# Patient Record
Sex: FEMALE | Race: WHITE | NOT HISPANIC OR LATINO | Employment: PART TIME | URBAN - METROPOLITAN AREA
[De-identification: names, ages, dates, MRNs, and addresses within clinical notes are randomized per-mention and may not be internally consistent; named-entity substitution may affect disease eponyms.]

---

## 2017-04-08 ENCOUNTER — COMMUNICATION - HEALTHEAST (OUTPATIENT)
Dept: FAMILY MEDICINE | Facility: CLINIC | Age: 37
End: 2017-04-08

## 2017-04-08 DIAGNOSIS — F33.9 MAJOR DEPRESSIVE DISORDER, RECURRENT EPISODE, UNSPECIFIED: ICD-10-CM

## 2017-04-19 ENCOUNTER — COMMUNICATION - HEALTHEAST (OUTPATIENT)
Dept: FAMILY MEDICINE | Facility: CLINIC | Age: 37
End: 2017-04-19

## 2017-06-21 ENCOUNTER — COMMUNICATION - HEALTHEAST (OUTPATIENT)
Dept: FAMILY MEDICINE | Facility: CLINIC | Age: 37
End: 2017-06-21

## 2017-06-21 DIAGNOSIS — F33.9 MAJOR DEPRESSIVE DISORDER, RECURRENT EPISODE, UNSPECIFIED: ICD-10-CM

## 2017-07-05 ENCOUNTER — COMMUNICATION - HEALTHEAST (OUTPATIENT)
Dept: FAMILY MEDICINE | Facility: CLINIC | Age: 37
End: 2017-07-05

## 2017-08-07 ENCOUNTER — COMMUNICATION - HEALTHEAST (OUTPATIENT)
Dept: FAMILY MEDICINE | Facility: CLINIC | Age: 37
End: 2017-08-07

## 2017-08-07 DIAGNOSIS — F33.9 MAJOR DEPRESSIVE DISORDER, RECURRENT EPISODE, UNSPECIFIED: ICD-10-CM

## 2017-08-12 ENCOUNTER — RECORDS - HEALTHEAST (OUTPATIENT)
Dept: ADMINISTRATIVE | Facility: OTHER | Age: 37
End: 2017-08-12

## 2017-08-14 ENCOUNTER — COMMUNICATION - HEALTHEAST (OUTPATIENT)
Dept: FAMILY MEDICINE | Facility: CLINIC | Age: 37
End: 2017-08-14

## 2017-09-02 ENCOUNTER — COMMUNICATION - HEALTHEAST (OUTPATIENT)
Dept: FAMILY MEDICINE | Facility: CLINIC | Age: 37
End: 2017-09-02

## 2017-09-02 DIAGNOSIS — F33.9 MAJOR DEPRESSIVE DISORDER, RECURRENT EPISODE, UNSPECIFIED: ICD-10-CM

## 2017-09-05 ENCOUNTER — COMMUNICATION - HEALTHEAST (OUTPATIENT)
Dept: FAMILY MEDICINE | Facility: CLINIC | Age: 37
End: 2017-09-05

## 2017-09-05 DIAGNOSIS — F33.9 MAJOR DEPRESSIVE DISORDER, RECURRENT EPISODE, UNSPECIFIED: ICD-10-CM

## 2017-09-11 ENCOUNTER — COMMUNICATION - HEALTHEAST (OUTPATIENT)
Dept: FAMILY MEDICINE | Facility: CLINIC | Age: 37
End: 2017-09-11

## 2017-10-07 ENCOUNTER — COMMUNICATION - HEALTHEAST (OUTPATIENT)
Dept: FAMILY MEDICINE | Facility: CLINIC | Age: 37
End: 2017-10-07

## 2017-10-07 DIAGNOSIS — F33.9 MAJOR DEPRESSIVE DISORDER, RECURRENT EPISODE (H): ICD-10-CM

## 2017-10-15 ENCOUNTER — COMMUNICATION - HEALTHEAST (OUTPATIENT)
Dept: FAMILY MEDICINE | Facility: CLINIC | Age: 37
End: 2017-10-15

## 2017-10-15 DIAGNOSIS — F33.9 MAJOR DEPRESSIVE DISORDER, RECURRENT EPISODE (H): ICD-10-CM

## 2017-10-25 ENCOUNTER — COMMUNICATION - HEALTHEAST (OUTPATIENT)
Dept: FAMILY MEDICINE | Facility: CLINIC | Age: 37
End: 2017-10-25

## 2017-11-19 ENCOUNTER — COMMUNICATION - HEALTHEAST (OUTPATIENT)
Dept: FAMILY MEDICINE | Facility: CLINIC | Age: 37
End: 2017-11-19

## 2017-11-19 DIAGNOSIS — F33.9 MAJOR DEPRESSIVE DISORDER, RECURRENT EPISODE (H): ICD-10-CM

## 2017-11-20 ENCOUNTER — COMMUNICATION - HEALTHEAST (OUTPATIENT)
Dept: FAMILY MEDICINE | Facility: CLINIC | Age: 37
End: 2017-11-20

## 2017-11-24 ENCOUNTER — OFFICE VISIT - HEALTHEAST (OUTPATIENT)
Dept: FAMILY MEDICINE | Facility: CLINIC | Age: 37
End: 2017-11-24

## 2017-11-24 DIAGNOSIS — F33.9 MAJOR DEPRESSIVE DISORDER, RECURRENT EPISODE (H): ICD-10-CM

## 2017-11-24 DIAGNOSIS — G47.00 INSOMNIA, UNSPECIFIED TYPE: ICD-10-CM

## 2017-11-24 DIAGNOSIS — R41.840 DIFFICULTY CONCENTRATING: ICD-10-CM

## 2017-11-24 DIAGNOSIS — N39.0 LOWER URINARY TRACT INFECTIOUS DISEASE: ICD-10-CM

## 2017-11-24 RX ORDER — NITROFURANTOIN 25; 75 MG/1; MG/1
CAPSULE ORAL
Qty: 30 CAPSULE | Refills: 1 | Status: SHIPPED | OUTPATIENT
Start: 2017-11-24 | End: 2022-04-18

## 2017-11-24 RX ORDER — HYDROXYZINE HYDROCHLORIDE 25 MG/1
25 TABLET, FILM COATED ORAL DAILY PRN
Qty: 30 TABLET | Refills: 2 | Status: SHIPPED | OUTPATIENT
Start: 2017-11-24 | End: 2023-09-28

## 2017-11-24 ASSESSMENT — MIFFLIN-ST. JEOR: SCORE: 1573.99

## 2019-03-17 ENCOUNTER — COMMUNICATION - HEALTHEAST (OUTPATIENT)
Dept: FAMILY MEDICINE | Facility: CLINIC | Age: 39
End: 2019-03-17

## 2019-03-17 DIAGNOSIS — F33.9 MAJOR DEPRESSIVE DISORDER, RECURRENT EPISODE (H): ICD-10-CM

## 2019-03-21 ENCOUNTER — AMBULATORY - HEALTHEAST (OUTPATIENT)
Dept: FAMILY MEDICINE | Facility: CLINIC | Age: 39
End: 2019-03-21

## 2019-03-21 DIAGNOSIS — F33.9 MAJOR DEPRESSIVE DISORDER, RECURRENT EPISODE (H): ICD-10-CM

## 2019-05-01 ENCOUNTER — COMMUNICATION - HEALTHEAST (OUTPATIENT)
Dept: FAMILY MEDICINE | Facility: CLINIC | Age: 39
End: 2019-05-01

## 2019-05-14 ENCOUNTER — COMMUNICATION - HEALTHEAST (OUTPATIENT)
Dept: FAMILY MEDICINE | Facility: CLINIC | Age: 39
End: 2019-05-14

## 2019-05-16 ENCOUNTER — AMBULATORY - HEALTHEAST (OUTPATIENT)
Dept: FAMILY MEDICINE | Facility: CLINIC | Age: 39
End: 2019-05-16

## 2019-05-16 ENCOUNTER — COMMUNICATION - HEALTHEAST (OUTPATIENT)
Dept: SCHEDULING | Facility: CLINIC | Age: 39
End: 2019-05-16

## 2019-05-16 ENCOUNTER — COMMUNICATION - HEALTHEAST (OUTPATIENT)
Dept: FAMILY MEDICINE | Facility: CLINIC | Age: 39
End: 2019-05-16

## 2019-05-16 DIAGNOSIS — F33.9 MAJOR DEPRESSIVE DISORDER, RECURRENT EPISODE (H): ICD-10-CM

## 2019-05-17 ENCOUNTER — COMMUNICATION - HEALTHEAST (OUTPATIENT)
Dept: FAMILY MEDICINE | Facility: CLINIC | Age: 39
End: 2019-05-17

## 2019-06-13 ENCOUNTER — AMBULATORY - HEALTHEAST (OUTPATIENT)
Dept: FAMILY MEDICINE | Facility: CLINIC | Age: 39
End: 2019-06-13

## 2019-06-13 ENCOUNTER — COMMUNICATION - HEALTHEAST (OUTPATIENT)
Dept: FAMILY MEDICINE | Facility: CLINIC | Age: 39
End: 2019-06-13

## 2019-06-13 DIAGNOSIS — F33.9 MAJOR DEPRESSIVE DISORDER, RECURRENT EPISODE (H): ICD-10-CM

## 2019-06-14 ENCOUNTER — COMMUNICATION - HEALTHEAST (OUTPATIENT)
Dept: TELEHEALTH | Facility: CLINIC | Age: 39
End: 2019-06-14

## 2019-06-14 ENCOUNTER — OFFICE VISIT - HEALTHEAST (OUTPATIENT)
Dept: FAMILY MEDICINE | Facility: CLINIC | Age: 39
End: 2019-06-14

## 2019-06-14 DIAGNOSIS — F33.9 MAJOR DEPRESSIVE DISORDER, RECURRENT EPISODE (H): ICD-10-CM

## 2019-06-17 ENCOUNTER — COMMUNICATION - HEALTHEAST (OUTPATIENT)
Dept: FAMILY MEDICINE | Facility: CLINIC | Age: 39
End: 2019-06-17

## 2019-08-04 ENCOUNTER — RECORDS - HEALTHEAST (OUTPATIENT)
Dept: SCHEDULING | Facility: CLINIC | Age: 39
End: 2019-08-04

## 2019-08-04 ENCOUNTER — COMMUNICATION - HEALTHEAST (OUTPATIENT)
Dept: FAMILY MEDICINE | Facility: CLINIC | Age: 39
End: 2019-08-04

## 2019-08-04 DIAGNOSIS — F33.9 MAJOR DEPRESSIVE DISORDER, RECURRENT EPISODE (H): ICD-10-CM

## 2019-09-20 ENCOUNTER — COMMUNICATION - HEALTHEAST (OUTPATIENT)
Dept: FAMILY MEDICINE | Facility: CLINIC | Age: 39
End: 2019-09-20

## 2020-01-14 ENCOUNTER — COMMUNICATION - HEALTHEAST (OUTPATIENT)
Dept: FAMILY MEDICINE | Facility: CLINIC | Age: 40
End: 2020-01-14

## 2020-01-14 DIAGNOSIS — F33.9 MAJOR DEPRESSIVE DISORDER, RECURRENT EPISODE (H): ICD-10-CM

## 2020-07-19 ENCOUNTER — COMMUNICATION - HEALTHEAST (OUTPATIENT)
Dept: FAMILY MEDICINE | Facility: CLINIC | Age: 40
End: 2020-07-19

## 2020-07-19 DIAGNOSIS — F33.9 MAJOR DEPRESSIVE DISORDER, RECURRENT EPISODE (H): ICD-10-CM

## 2020-07-20 RX ORDER — BUPROPION HYDROCHLORIDE 150 MG/1
TABLET ORAL
Qty: 30 TABLET | Refills: 0 | Status: SHIPPED | OUTPATIENT
Start: 2020-07-20 | End: 2022-04-18

## 2020-07-23 ENCOUNTER — COMMUNICATION - HEALTHEAST (OUTPATIENT)
Dept: FAMILY MEDICINE | Facility: CLINIC | Age: 40
End: 2020-07-23

## 2020-07-23 DIAGNOSIS — F33.9 MAJOR DEPRESSIVE DISORDER, RECURRENT EPISODE (H): ICD-10-CM

## 2020-08-07 ENCOUNTER — COMMUNICATION - HEALTHEAST (OUTPATIENT)
Dept: FAMILY MEDICINE | Facility: CLINIC | Age: 40
End: 2020-08-07

## 2020-08-07 DIAGNOSIS — F33.9 MAJOR DEPRESSIVE DISORDER, RECURRENT EPISODE (H): ICD-10-CM

## 2020-10-08 ENCOUNTER — OFFICE VISIT - HEALTHEAST (OUTPATIENT)
Dept: FAMILY MEDICINE | Facility: CLINIC | Age: 40
End: 2020-10-08

## 2020-10-08 DIAGNOSIS — F41.9 ANXIETY: ICD-10-CM

## 2020-10-08 DIAGNOSIS — F33.0 MILD EPISODE OF RECURRENT MAJOR DEPRESSIVE DISORDER (H): ICD-10-CM

## 2020-10-08 DIAGNOSIS — I10 BENIGN ESSENTIAL HYPERTENSION: ICD-10-CM

## 2020-10-08 DIAGNOSIS — E66.01 MORBID OBESITY (H): ICD-10-CM

## 2020-10-08 ASSESSMENT — PATIENT HEALTH QUESTIONNAIRE - PHQ9: SUM OF ALL RESPONSES TO PHQ QUESTIONS 1-9: 3

## 2020-11-03 ENCOUNTER — COMMUNICATION - HEALTHEAST (OUTPATIENT)
Dept: FAMILY MEDICINE | Facility: CLINIC | Age: 40
End: 2020-11-03

## 2020-11-03 DIAGNOSIS — I10 BENIGN ESSENTIAL HYPERTENSION: ICD-10-CM

## 2020-11-09 ENCOUNTER — COMMUNICATION - HEALTHEAST (OUTPATIENT)
Dept: FAMILY MEDICINE | Facility: CLINIC | Age: 40
End: 2020-11-09

## 2020-11-09 DIAGNOSIS — F33.9 MAJOR DEPRESSIVE DISORDER, RECURRENT EPISODE (H): ICD-10-CM

## 2020-11-10 RX ORDER — PAROXETINE 30 MG/1
TABLET, FILM COATED ORAL
Qty: 180 TABLET | Refills: 2 | Status: SHIPPED | OUTPATIENT
Start: 2020-11-10 | End: 2021-09-07

## 2020-11-24 ENCOUNTER — AMBULATORY - HEALTHEAST (OUTPATIENT)
Dept: FAMILY MEDICINE | Facility: CLINIC | Age: 40
End: 2020-11-24

## 2020-11-24 DIAGNOSIS — Z79.899 MEDICATION MANAGEMENT: ICD-10-CM

## 2020-11-24 DIAGNOSIS — Z13.220 LIPID SCREENING: ICD-10-CM

## 2020-12-05 ENCOUNTER — COMMUNICATION - HEALTHEAST (OUTPATIENT)
Dept: FAMILY MEDICINE | Facility: CLINIC | Age: 40
End: 2020-12-05

## 2020-12-05 DIAGNOSIS — I10 BENIGN ESSENTIAL HYPERTENSION: ICD-10-CM

## 2021-01-18 ENCOUNTER — AMBULATORY - HEALTHEAST (OUTPATIENT)
Dept: LAB | Facility: CLINIC | Age: 41
End: 2021-01-18

## 2021-01-18 DIAGNOSIS — Z79.899 MEDICATION MANAGEMENT: ICD-10-CM

## 2021-01-18 DIAGNOSIS — Z13.220 LIPID SCREENING: ICD-10-CM

## 2021-01-18 LAB
ANION GAP SERPL CALCULATED.3IONS-SCNC: 12 MMOL/L (ref 5–18)
BUN SERPL-MCNC: 10 MG/DL (ref 8–22)
CALCIUM SERPL-MCNC: 9.2 MG/DL (ref 8.5–10.5)
CHLORIDE BLD-SCNC: 105 MMOL/L (ref 98–107)
CHOLEST SERPL-MCNC: 299 MG/DL
CO2 SERPL-SCNC: 22 MMOL/L (ref 22–31)
CREAT SERPL-MCNC: 0.81 MG/DL (ref 0.6–1.1)
FASTING STATUS PATIENT QL REPORTED: YES
GFR SERPL CREATININE-BSD FRML MDRD: >60 ML/MIN/1.73M2
GLUCOSE BLD-MCNC: 109 MG/DL (ref 70–125)
HDLC SERPL-MCNC: 37 MG/DL
LDLC SERPL CALC-MCNC: 218 MG/DL
LDLC SERPL CALC-MCNC: ABNORMAL MG/DL
POTASSIUM BLD-SCNC: 4.5 MMOL/L (ref 3.5–5)
SODIUM SERPL-SCNC: 139 MMOL/L (ref 136–145)
TRIGL SERPL-MCNC: 490 MG/DL

## 2021-01-19 ENCOUNTER — COMMUNICATION - HEALTHEAST (OUTPATIENT)
Dept: FAMILY MEDICINE | Facility: CLINIC | Age: 41
End: 2021-01-19

## 2021-01-19 DIAGNOSIS — I10 BENIGN ESSENTIAL HYPERTENSION: ICD-10-CM

## 2021-01-19 RX ORDER — HYDROCHLOROTHIAZIDE 12.5 MG/1
12.5 TABLET ORAL DAILY
Qty: 90 TABLET | Refills: 3 | Status: SHIPPED | OUTPATIENT
Start: 2021-01-19 | End: 2022-04-18

## 2021-01-24 ENCOUNTER — AMBULATORY - HEALTHEAST (OUTPATIENT)
Dept: FAMILY MEDICINE | Facility: CLINIC | Age: 41
End: 2021-01-24

## 2021-01-24 DIAGNOSIS — E78.2 MIXED HYPERLIPIDEMIA: ICD-10-CM

## 2021-01-24 DIAGNOSIS — R73.01 IMPAIRED FASTING GLUCOSE: ICD-10-CM

## 2021-05-27 ASSESSMENT — PATIENT HEALTH QUESTIONNAIRE - PHQ9: SUM OF ALL RESPONSES TO PHQ QUESTIONS 1-9: 3

## 2021-05-28 NOTE — TELEPHONE ENCOUNTER
Refill Request  Did you contact pharmacy: No  Medication name: PARoxetine (PAXIL) 30 MG tablet   Medication   Date: 11/24/2017 Department: [facility] Medicine/OB Ordering/Authorizing: Teresa Ortiz CNP  Order Providers     Prescribing Provider Encounter Provider  Teresa Ortiz CNP Pierce, Jana L, CNP  Outpatient Medication Detail      Disp Refills Start End   PARoxetine (PAXIL) 30 MG tablet 180 tablet 3 11/24/2017    Sig: TAKE 2 TABLETS BY MOUTH EVERY MORNING.   Sent to pharmacy as: PARoxetine 30 mg tablet   E-Prescribing Status: Receipt confirmed by pharmacy (11/24/2017 10:58 AM CST)   Associated Diagnoses     Major depressive disorder, recurrent episode (H)  - Primary     Pharmacy     [place] [address] [place] [address]  Requested Prescriptions      No prescriptions requested or ordered in this encounter     Who prescribed the medication: Teresa Ortiz  Pharmacy Name and Location: [facility]  Is patient out of medication: No.  2 days left  Patient notified refills processed in 72 hours:  yes  Okay to leave a detailed message: yes

## 2021-05-28 NOTE — TELEPHONE ENCOUNTER
I sent a prescription to the pharmacy for the Paroxetine.  She should keep her appointment, please. Thanks.

## 2021-05-28 NOTE — TELEPHONE ENCOUNTER
Left message to call back for: pt  Information to relay to patient:  Left message to call and schedule medication check with Teresa for refill.

## 2021-05-28 NOTE — TELEPHONE ENCOUNTER
"Who is calling:  The patient   Reason for Call:  The patient is calling to check the status of the medication request. The patient is aware that the script is pending review. The patient complained of having \"withdrawal symptoms\" the patient was transferred to the triage nurse.    Date of last appointment with primary care: 11/24/2017  Okay to leave a detailed message: No  "

## 2021-05-28 NOTE — TELEPHONE ENCOUNTER
Left message to call back for: Patient.  Information to relay to patient: Please help patient schedule a medication check. Thank you.

## 2021-05-28 NOTE — TELEPHONE ENCOUNTER
Who is calling:  Patient   Reason for Call:  States she is running late for her appointment.  Please call her back as she is on her way now.  Date of last appointment with primary care:   Okay to leave a detailed message: Yes

## 2021-05-28 NOTE — TELEPHONE ENCOUNTER
I have not seen her since 2017.  She needs an office visit.  Okay to use same day/reserved.  Thanks.

## 2021-05-28 NOTE — TELEPHONE ENCOUNTER
Medication Request  Medication name: PARoxetine (PAXIL) 30 MG tablet  Pharmacy Name and Location: [facility]  Reason for request: Out, the patient does have an appointment with Teresa Ortiz on Friday.  When did you use medication last?:  Today.  Patient offered appointment:  Already has one.  Okay to leave a detailed message: yes

## 2021-05-28 NOTE — TELEPHONE ENCOUNTER
Medication Request  Medication name:  Paroxetine  Pharmacy Name and Location:  [facility] in [place]  Reason for request:  patient does not feel right, she feels that she has been off the medication too long, she is shaky, could she get a few called in to pharmacy till she is seen on 5/17/2019  When did you use medication last?:   2017  Patient offered appointment:  Patient has an appointment on 5/17/2019  Okay to leave a detailed message: yes

## 2021-05-28 NOTE — TELEPHONE ENCOUNTER
RN cannot approve Refill Request    RN can NOT refill this medication PCP messaged that patient is overdue for Office Visit. Last office visit: 11/24/2017 Teresa Ortiz CNP Last Physical: Visit date not found Last MTM visit: Visit date not found Last visit same specialty: 11/24/2017 Teresa Ortiz CNP.  Next visit within 3 mo: Visit date not found  Next physical within 3 mo: Visit date not found      Dahiana Mcghee, [facility] Triage/Med Refill 5/16/2019    Requested Prescriptions   Pending Prescriptions Disp Refills     PARoxetine (PAXIL) 30 MG tablet [Pharmacy Med Name: PAROXETINE HCL 30 MG TABLET] 180 tablet 2     Sig: TAKE 2 TABLETS BY MOUTH EVERY MORNING.       SSRI Refill Protocol  Failed - 5/16/2019  5:18 PM        Failed - PCP or prescribing provider visit in last year     Last office visit with prescriber/PCP: 11/24/2017 Teresa Ortiz CNP OR same dept: Visit date not found OR same specialty: 11/24/2017 Teresa Ortiz CNP  Last physical: Visit date not found Last MTM visit: Visit date not found   Next visit within 3 mo: Visit date not found  Next physical within 3 mo: Visit date not found  Prescriber OR PCP: Teresa Ortiz CNP  Last diagnosis associated with med order: 1. Major depressive disorder, recurrent episode (H)  - PARoxetine (PAXIL) 30 MG tablet [Pharmacy Med Name: PAROXETINE HCL 30 MG TABLET]; TAKE 2 TABLETS BY MOUTH EVERY MORNING.  Dispense: 180 tablet; Refill: 2    If protocol passes may refill for 12 months if within 3 months of last provider visit (or a total of 15 months).             Failed - Age 21 and younger route to prescribing provider     Last office visit with prescriber/PCP: 11/24/2017 Teresa Ortiz CNP OR same dept: Visit date not found OR same specialty: 11/24/2017 Teresa Ortiz CNP  Last physical: Visit date not found Last MTM visit: Visit date not found   Next visit within 3 mo: Visit date not found  Next physical within 3 mo: Visit date not found  Prescriber OR  PCP: Teresa Ortiz CNP  Last diagnosis associated with med order: 1. Major depressive disorder, recurrent episode (H)  - PARoxetine (PAXIL) 30 MG tablet [Pharmacy Med Name: PAROXETINE HCL 30 MG TABLET]; TAKE 2 TABLETS BY MOUTH EVERY MORNING.  Dispense: 180 tablet; Refill: 2    If protocol passes may refill for 12 months if within 3 months of last provider visit (or a total of 15 months).

## 2021-05-29 NOTE — TELEPHONE ENCOUNTER
Fax received from [facility] pharmacy requesting Prior Authorization    Medication Name:   buPROPion (WELLBUTRIN XL) 150 MG 24 hr tablet 30 tablet 6 6/14/2019     Sig - Route: Take 1 tablet (150 mg total) by mouth daily. - Oral    Sent to pharmacy as: buPROPion (WELLBUTRIN XL) 150 MG 24 hr tablet    E-Prescribing Status: Receipt confirmed by pharmacy (6/14/2019  4:51 PM CDT)          Insurance Plan:    AVERYM: Tayla   Patient ID Number: [id number]    Please start PA process

## 2021-05-29 NOTE — TELEPHONE ENCOUNTER
Central PA team  [phone]  Pool: HE PA MED ([id number])          PA has been initiated.       PA form completed and faxed insurance via Cover My Meds     Key: [id number]     Medication: buPROPion (WELLBUTRIN XL) 150 MG 24 hr tablet    Insurance:  [employer]        Response will be received via fax and may take up to 5-10 business days depending on plan

## 2021-05-29 NOTE — TELEPHONE ENCOUNTER
RN cannot approve Refill Request    RN can NOT refill this medication overdue for office visits and/or labs.    Chung Hernández, [facility] Triage/Med Refill 6/13/2019    Requested Prescriptions   Pending Prescriptions Disp Refills     PARoxetine (PAXIL) 30 MG tablet [Pharmacy Med Name: PAROXETINE HCL 30 MG TABLET] 60 tablet 0     Sig: TAKE 2 TABLETS (60 MG TOTAL) BY MOUTH EVERY MORNING.       [facility]RI Refill Protocol  Failed - 6/13/2019  1:15 AM        Failed - PCP or prescribing provider visit in last year     Last office visit with prescriber/PCP: 11/24/2017 Teresa Ortiz CNP OR same dept: Visit date not found OR same specialty: 11/24/2017 Teresa Ortiz CNP  Last physical: Visit date not found Last MTM visit: Visit date not found   Next visit within 3 mo: Visit date not found  Next physical within 3 mo: Visit date not found  Prescriber OR PCP: Teresa Ortiz CNP  Last diagnosis associated with med order: 1. Major depressive disorder, recurrent episode (H)  - PARoxetine (PAXIL) 30 MG tablet [Pharmacy Med Name: PAROXETINE HCL 30 MG TABLET]; Take 2 tablets (60 mg total) by mouth every morning.  Dispense: 60 tablet; Refill: 0    If protocol passes may refill for 12 months if within 3 months of last provider visit (or a total of 15 months).

## 2021-05-29 NOTE — PROGRESS NOTES
Patient ID: Rosa Maria Mehta is a 38 y.o. female.  /84   Pulse 85   Wt (!) 254 lb 1.6 oz (115.3 kg)   SpO2 98%   BMI 39.80 kg/m      Assessment/Plan:                Diagnoses and all orders for this visit:    Major depressive disorder, recurrent episode (H)  -     buPROPion (WELLBUTRIN XL) 150 MG 24 hr tablet; Take 1 tablet (150 mg total) by mouth daily.  Dispense: 30 tablet; Refill: 6  -     PARoxetine (PAXIL) 30 MG tablet; Take 2 tablets (60 mg total) by mouth every morning.  Dispense: 180 tablet; Refill: 3      DISCUSSION  Elected to add bupropion 150 mg extended release once daily to her current dose of paroxetine.  She will monitor closely.  Paroxetine is refilled today.  Would recommend a follow-up visit in 3 to 6 months if everything is going reasonably well.  If there are concerns of side effects patient is encouraged to call and/or schedule a follow-up visit in a timely manner to reassess the situation and decide on further course of action.  Subjective:     HPI    Rosa Maria Mehta is a 38 y.o. female with reported long-standing history of major depression.  Patient previous been on SSRI therapy with sertraline which was reported to have caused significant side effects and citalopram which was believed to be ineffective.  She is currently on paroxetine 60 mg daily which she has been on for several years.  Overall patient reports reasonably good control of depression symptoms.  Patient denies any significant anxiety.  Patient completes a PHQ 9 questionnaire and her score is 3.  Patient states that despite this score she feels that the medication is just not as effective as it has been in the past.  She denies any significant concerning symptoms, specifically any suicidality.  She states that this point her depression symptoms do not cause significant overall dysfunction in her life and that the medication really seems to help manage.  She does still feel she would like to get more out of medication  and inquires about options either for change in treatment or additional treatment.  She denies significant side effects of the medication.    Day we discussed options including changing medication versus additional medication.  We discussed the risks and benefits of any approach to alteration of treatment.    Review of Systems  Complete review of systems is obtained.  Other than the specific considerations noted above complete review of systems is negative.       Objective:   Medications:  Current Outpatient Medications   Medication Sig     hydrOXYzine (ATARAX) 25 MG tablet Take 1 tablet (25 mg total) by mouth daily as needed.     nitrofurantoin, macrocrystal-monohydrate, (MACROBID) 100 MG capsule TAKE 1 CAPUSLE BY MOUTH AFTER SEXUAL INTERCOURSE.     ondansetron (ZOFRAN-ODT) 4 MG disintegrating tablet Place 4 mg under the tongue.     PARoxetine (PAXIL) 30 MG tablet Take 2 tablets (60 mg total) by mouth every morning.     buPROPion (WELLBUTRIN XL) 150 MG 24 hr tablet Take 1 tablet (150 mg total) by mouth daily.     Allergies:  No Known Allergies    Tobacco:   reports that she has quit smoking. She has never used smokeless tobacco.     Physical Exam      /84   Pulse 85   Wt (!) 254 lb 1.6 oz (115.3 kg)   SpO2 98%   BMI 39.80 kg/m      General Appearance:    Alert, cooperative, no distress

## 2021-05-31 VITALS — HEIGHT: 67 IN | BODY MASS INDEX: 30.15 KG/M2 | WEIGHT: 192.1 LBS

## 2021-05-31 NOTE — TELEPHONE ENCOUNTER
Refill Approved    Rx renewed per Medication Renewal Policy. Medication was last renewed on 6/14/19.    Umu Harden, [facility] Triage/Med Refill 8/4/2019     Requested Prescriptions   Pending Prescriptions Disp Refills     buPROPion (WELLBUTRIN XL) 150 MG 24 hr tablet [Pharmacy Med Name: BUPROPION HCL  MG TABLET] 90 tablet 2     Sig: TAKE 1 TABLET BY MOUTH EVERY DAY       Tricyclics/Misc Antidepressant/Antianxiety Meds Refill Protocol Passed - 8/4/2019 11:18 AM        Passed - PCP or prescribing provider visit in last year     Last office visit with prescriber/PCP: 6/14/2019 Chung Barbosa MD OR same dept: 6/14/2019 Chung Barbosa MD OR same specialty: 6/14/2019 Chung Barbosa MD  Last physical: Visit date not found Last MTM visit: Visit date not found   Next visit within 3 mo: Visit date not found  Next physical within 3 mo: Visit date not found  Prescriber OR PCP: Chung Barbosa MD  Last diagnosis associated with med order: 1. Major depressive disorder, recurrent episode (H)  - buPROPion (WELLBUTRIN XL) 150 MG 24 hr tablet [Pharmacy Med Name: BUPROPION HCL  MG TABLET]; TAKE 1 TABLET BY MOUTH EVERY DAY  Dispense: 90 tablet; Refill: 2    If protocol passes may refill for 12 months if within 3 months of last provider visit (or a total of 15 months).

## 2021-06-03 VITALS — BODY MASS INDEX: 39.8 KG/M2 | WEIGHT: 254.1 LBS

## 2021-06-09 NOTE — TELEPHONE ENCOUNTER
Left message to call back for: pt  Information to relay to patient:  2nd message to call and schedule med check with Teresa Ortiz re : wellbutrin

## 2021-06-09 NOTE — TELEPHONE ENCOUNTER
RN cannot approve Refill Request    RN can NOT refill this medication PCP messaged that patient is overdue for Office Visit. Last office visit: 11/24/2017 Teresa Ortiz CNP Last Physical: Visit date not found Last MTM visit: Visit date not found Last visit same specialty: 6/14/2019 Chung Barbosa MD.  Next visit within 3 mo: Visit date not found  Next physical within 3 mo: Visit date not found      Marianela Rogers, [facility] Triage/Med Refill 7/19/2020    Requested Prescriptions   Pending Prescriptions Disp Refills     buPROPion (WELLBUTRIN XL) 150 MG 24 hr tablet [Pharmacy Med Name: BUPROPION HCL  MG TABLET] 90 tablet 1     Sig: TAKE 1 TABLET BY MOUTH EVERY DAY       Tricyclics/Misc Antidepressant/Antianxiety Meds Refill Protocol Failed - 7/19/2020 12:34 AM        Failed - PCP or prescribing provider visit in last year     Last office visit with prescriber/PCP: 11/24/2017 Teresa Ortiz CNP OR same dept: Visit date not found OR same specialty: 6/14/2019 Chung Barbosa MD  Last physical: Visit date not found Last MTM visit: Visit date not found   Next visit within 3 mo: Visit date not found  Next physical within 3 mo: Visit date not found  Prescriber OR PCP: Teresa Ortiz CNP  Last diagnosis associated with med order: 1. Major depressive disorder, recurrent episode (H)  - buPROPion (WELLBUTRIN XL) 150 MG 24 hr tablet [Pharmacy Med Name: BUPROPION HCL  MG TABLET]; TAKE 1 TABLET BY MOUTH EVERY DAY  Dispense: 90 tablet; Refill: 1    If protocol passes may refill for 12 months if within 3 months of last provider visit (or a total of 15 months).

## 2021-06-09 NOTE — TELEPHONE ENCOUNTER
RN cannot approve Refill Request    RN can NOT refill this medication Protocol failed and NO refill given. Last office visit: 6/14/2019 Chung Barbosa MD Last Physical: Visit date not found Last MTM visit: Visit date not found Last visit same specialty: 6/14/2019 Chung Barbosa MD.  Next visit within 3 mo: Visit date not found  Next physical within 3 mo: Visit date not found      Cheryl Cruz, [facility] Triage/Med Refill 7/24/2020    Requested Prescriptions   Pending Prescriptions Disp Refills     PARoxetine (PAXIL) 30 MG tablet [Pharmacy Med Name: PAROXETINE HCL 30 MG TABLET] 180 tablet 3     Sig: TAKE 2 TABS BY MOUTH ONCE DAILY IN THE MORNING       [facility] Refill Protocol  Failed - 7/23/2020  1:32 AM        Failed - PCP or prescribing provider visit in last year     Last office visit with prescriber/PCP: 6/14/2019 Chung Barbosa MD OR same dept: Visit date not found OR same specialty: 6/14/2019 Chung Barbosa MD  Last physical: Visit date not found Last MTM visit: Visit date not found   Next visit within 3 mo: Visit date not found  Next physical within 3 mo: Visit date not found  Prescriber OR PCP: Chung Barbosa MD  Last diagnosis associated with med order: 1. Major depressive disorder, recurrent episode (H)  - PARoxetine (PAXIL) 30 MG tablet [Pharmacy Med Name: PAROXETINE HCL 30 MG TABLET]; TAKE 2 TABS BY MOUTH ONCE DAILY IN THE MORNING  Dispense: 180 tablet; Refill: 3    If protocol passes may refill for 12 months if within 3 months of last provider visit (or a total of 15 months).

## 2021-06-09 NOTE — TELEPHONE ENCOUNTER
Left message to call back for: pt  Information to relay to patient:  Left message to call and schedule med ck with Teresa Ortiz re:wellbutrin

## 2021-06-12 NOTE — TELEPHONE ENCOUNTER
RN cannot approve Refill Request    RN can NOT refill this medication Protocol failed and NO refill given. Last office visit: 11/24/2017 Teresa Ortiz CNP Last Physical: Visit date not found Last MTM visit: Visit date not found Last visit same specialty: 6/14/2019 Chung Barbosa MD.  Next visit within 3 mo: Visit date not found  Next physical within 3 mo: Visit date not found      Marquita De La Cruz, [facility] Triage/Med Refill 11/5/2020    Requested Prescriptions   Pending Prescriptions Disp Refills     hydroCHLOROthiazide (HYDRODIURIL) 12.5 MG tablet [Pharmacy Med Name: HYDROCHLOROTHIAZIDE 12.5 MG TB] 30 tablet 0     Sig: TAKE 1 TABLET BY MOUTH EVERY DAY       Diuretics/Combination Diuretics Refill Protocol  Failed - 11/3/2020  7:33 AM        Failed - Serum Potassium in past 12 months      No results found for: LN-POTASSIUM          Failed - Serum Sodium in past 12 months      No results found for: LN-SODIUM          Failed - Blood pressure on file in past 12 months     BP Readings from Last 1 Encounters:   06/14/19 136/84             Failed - Serum Creatinine in past 12 months      Creatinine   Date Value Ref Range Status   05/23/2016 0.89 0.60 - 1.10 mg/dL Final             Passed - Visit with PCP or prescribing provider visit in past 12 months     Last office visit with prescriber/PCP: 11/24/2017 Teresa Ortiz CNP OR same dept: Visit date not found OR same specialty: 6/14/2019 Chung Barbosa MD  Last physical: Visit date not found Last MTM visit: Visit date not found   Next visit within 3 mo: Visit date not found  Next physical within 3 mo: Visit date not found  Prescriber OR PCP: Teresa Ortiz CNP  Last diagnosis associated with med order: 1. Benign essential hypertension  - hydroCHLOROthiazide (HYDRODIURIL) 12.5 MG tablet [Pharmacy Med Name: HYDROCHLOROTHIAZIDE 12.5 MG TB]; TAKE 1 TABLET BY MOUTH EVERY DAY  Dispense: 30 tablet; Refill: 0    If protocol passes may refill for 12 months if within 3  months of last provider visit (or a total of 15 months).

## 2021-06-12 NOTE — TELEPHONE ENCOUNTER
Left message to call back for: pt  Information to relay to patient:  Left message to call and schedule follow up blood pressure with Teresa Ortiz by face to face of virtual.

## 2021-06-12 NOTE — PROGRESS NOTES
"Rosa Maria Mehta is a 39 y.o. female who is being evaluated via a billable video visit.      The patient has been notified of following:     \"This video visit will be conducted via a call between you and your physician/provider. We have found that certain health care needs can be provided without the need for an in-person physical exam.  This service lets us provide the care you need with a video conversation.  If a prescription is necessary we can send it directly to your pharmacy.  If lab work is needed we can place an order for that and you can then stop by our lab to have the test done at a later time.    Video visits are billed at different rates depending on your insurance coverage. Please reach out to your insurance provider with any questions.    If during the course of the call the physician/provider feels a video visit is not appropriate, you will not be charged for this service.\"    Patient has given verbal consent to a Video visit? Yes  How would you like to obtain your AVS? AVS Preference: MyChart.  If dropped by the video visit, the video invitation should be sent to: Text to cell phone: [phone]  Will anyone else be joining your video visit? No        Video Start Time: 12:04 PM    Additional provider notes:  Assessment and Plan:     1. Benign essential hypertension  hydroCHLOROthiazide (HYDRODIURIL) 12.5 MG tablet   2. Mild episode of recurrent major depressive disorder (H)     3. Anxiety     4. Morbid obesity (H)       Discussed treatment options.  Will start hydrochlorothiazide 12.5 mg daily.  Educated on its medications and side effects.  She will continue to monitor blood pressure at home.  She will follow-up in 2 weeks for fasting physical.  We will recheck a BMP then.  Discussed weight loss goals.  She is to avoid consuming caffeine and sodium.  She continues to take paroxetine and bupropion for depression and anxiety.  Obtained PHQ 9 score of 3.  Patient is content with the plan will " follow-up in 2 weeks.    Subjective:     Rosa Maria is a 39 y.o. female presenting for a video visit.  She has multiple comorbidities including depression, anxiety, obesity.  Patient states 6 months ago, her blood pressure was 172/110 at the dentist.  Since then, she has been monitoring her blood pressure at home and it has been ranging 130/100.  Her mother started taking an antihypertensive at the age of 40 and her maternal grandmother also had high blood pressure.  Patient admits to underlying anxiety and depression.  She is currently taking paroxetine and bupropion.  Patient denies headache, blurry vision, chest pain, shortness of breath with exertion, edema, orthopnea, syncope.    Review of Systems: A complete 14 point review of systems was obtained and is negative or as stated in the history of present illness.    Social History     Socioeconomic History     Marital status:      Spouse name: Not on file     Number of children: Not on file     Years of education: Not on file     Highest education level: Not on file   Occupational History     Not on file   Social Needs     Financial resource strain: Not on file     Food insecurity     Worry: Not on file     Inability: Not on file     Transportation needs     Medical: Not on file     Non-medical: Not on file   Tobacco Use     Smoking status: Former Smoker     Smokeless tobacco: Never Used   Substance and Sexual Activity     Alcohol use: No     Drug use: No     Sexual activity: Yes     Partners: Male     Comment:    Lifestyle     Physical activity     Days per week: Not on file     Minutes per session: Not on file     Stress: Not on file   Relationships     Social connections     Talks on phone: Not on file     Gets together: Not on file     Attends [employer] service: Not on file     Active member of club or organization: Not on file     Attends meetings of clubs or organizations: Not on file     Relationship status: Not on file     Intimate partner  violence     Fear of current or ex partner: Not on file     Emotionally abused: Not on file     Physically abused: Not on file     Forced sexual activity: Not on file   Other Topics Concern     Not on file   Social History Narrative     Not on file       Active Ambulatory Problems     Diagnosis Date Noted     Depression      Pain During Urination (Dysuria)      Onychomycosis      Obesity      Major Depression, Recurrent      Fatigue      Vitamin D Deficiency      Hyperlipidemia      Easy bruising 10/27/2014     Resolved Ambulatory Problems     Diagnosis Date Noted     Urticaria      Past Medical History:   Diagnosis Date     Depression        Family History   Problem Relation Age of Onset     Lung cancer Mother      Thyroid disease Mother      Throat cancer Father      Alcohol abuse Father      Hypertension Maternal Grandmother      Prostate cancer Maternal Grandfather        Objective:     There were no vitals taken for this visit.     GENERAL: Healthy, alert and no distress  EYES: Eyes grossly normal to inspection. No discharge or erythema, or obvious scleral/conjunctival abnormalities.  HENT: Normal cephalic/atraumatic.  External ears, nose and mouth without ulcers or lesions. No nasal drainage visible.  NECK: No asymmetry, masses or scars  RESP: No audible wheeze, cough, or visible cyanosis.  No visible retractions or increased work of breathing.    MS: No gross musculoskeletal defects noted.  Normal range of motion. No visible edema.  SKIN: Visible skin clear. No significant rash, abnormal pigmentation or lesions.  NEURO: Cranial nerves grossly intact. Mentation and speech appropriate for age.  PSYCH: Mentation appears normal, affect normal/bright, judgement and insight intact, normal speech and appearance well-groomed      Video-Visit Details    Type of service:  Video Visit    Video End Time (time video stopped): 12:13 PM  Originating Location (pt. Location): Home    Distant Location (provider location):    [facility]     Platform used for Video Visit: Jimmy Ortiz, CNP

## 2021-06-13 NOTE — TELEPHONE ENCOUNTER
Left message to call back for: pt  Information to relay to patient:  3rd attempt to schedule ofv for med check with pcp.

## 2021-06-13 NOTE — TELEPHONE ENCOUNTER
RN cannot approve Refill Request    RN can NOT refill this medication PCP messaged that patient is overdue for Labs. Last office visit: 11/24/2017 Teresa Ortiz CNP Last Physical: Visit date not found Last MTM visit: Visit date not found Last visit same specialty: 6/14/2019 Chung Barbosa MD.  Next visit within 3 mo: Visit date not found  Next physical within 3 mo: Visit date not found      Marianela Rogers, [facility] Triage/Med Refill 12/6/2020    Requested Prescriptions   Pending Prescriptions Disp Refills     hydroCHLOROthiazide (HYDRODIURIL) 12.5 MG tablet [Pharmacy Med Name: HYDROCHLOROTHIAZIDE 12.5 MG TB] 30 tablet 0     Sig: TAKE 1 TABLET BY MOUTH EVERY DAY       Diuretics/Combination Diuretics Refill Protocol  Failed - 12/5/2020  1:37 PM        Failed - Serum Potassium in past 12 months      No results found for: LN-POTASSIUM          Failed - Serum Sodium in past 12 months      No results found for: LN-SODIUM          Failed - Blood pressure on file in past 12 months     BP Readings from Last 1 Encounters:   06/14/19 136/84             Failed - Serum Creatinine in past 12 months      Creatinine   Date Value Ref Range Status   05/23/2016 0.89 0.60 - 1.10 mg/dL Final             Passed - Visit with PCP or prescribing provider visit in past 12 months     Last office visit with prescriber/PCP: 11/24/2017 Teresa Ortiz CNP OR same dept: Visit date not found OR same specialty: 6/14/2019 Chung Barbosa MD  Last physical: Visit date not found Last MTM visit: Visit date not found   Next visit within 3 mo: Visit date not found  Next physical within 3 mo: Visit date not found  Prescriber OR PCP: Teresa Ortiz CNP  Last diagnosis associated with med order: 1. Benign essential hypertension  - hydroCHLOROthiazide (HYDRODIURIL) 12.5 MG tablet [Pharmacy Med Name: HYDROCHLOROTHIAZIDE 12.5 MG TB]; TAKE 1 TABLET BY MOUTH EVERY DAY  Dispense: 30 tablet; Refill: 0    If protocol passes may refill for 12  months if within 3 months of last provider visit (or a total of 15 months).

## 2021-06-13 NOTE — TELEPHONE ENCOUNTER
Refill Approved    Rx renewed per Medication Renewal Policy. Medication was last renewed on 8/7/2020.  Last office visit 10/8/2020    Rhina Smith, [facility] [place] Connection Triage/Med Refill 11/10/2020     Requested Prescriptions   Pending Prescriptions Disp Refills     PARoxetine (PAXIL) 30 MG tablet [Pharmacy Med Name: PAROXETINE HCL 30 MG TABLET] 180 tablet 1     Sig: TAKE 2 TABS BY MOUTH ONCE DAILY IN THE MORNING       [facility] Refill Protocol  Passed - 11/9/2020 12:18 AM        Passed - PCP or prescribing provider visit in last year     Last office visit with prescriber/PCP: 6/14/2019 Chung Barbosa MD OR same dept: Visit date not found OR same specialty: 6/14/2019 Chung Barbosa MD  Last physical: Visit date not found Last MTM visit: Visit date not found   Next visit within 3 mo: Visit date not found  Next physical within 3 mo: Visit date not found  Prescriber OR PCP: Chung Barbosa MD  Last diagnosis associated with med order: 1. Major depressive disorder, recurrent episode (H)  - PARoxetine (PAXIL) 30 MG tablet [Pharmacy Med Name: PAROXETINE HCL 30 MG TABLET]; TAKE 2 TABS BY MOUTH ONCE DAILY IN THE MORNING  Dispense: 180 tablet; Refill: 1    If protocol passes may refill for 12 months if within 3 months of last provider visit (or a total of 15 months).

## 2021-06-14 NOTE — TELEPHONE ENCOUNTER
Patient completed lab work and now would like her hydrochlorothiazide 12.5 mg daily refilled.      Please send to the pharmacy if appropriate
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
## 2021-06-16 PROBLEM — F41.9 ANXIETY: Status: ACTIVE | Noted: 2020-10-08

## 2021-06-16 PROBLEM — E66.01 MORBID OBESITY (H): Status: ACTIVE | Noted: 2020-10-08

## 2021-06-16 NOTE — TELEPHONE ENCOUNTER
Telephone Encounter by Susy Lamas at 6/17/2019  4:30 PM     Author: Susy Lamas Service: -- Author Type: --    Filed: 6/17/2019  4:31 PM Encounter Date: 6/17/2019 Status: Signed    : Susy Lamas APPROVED:    Approval start date: 6/17/2019  Approval end date: 6/17/2020    Pharmacy has been notified of approval and will contact patient when medication is ready for pickup.

## 2021-06-19 NOTE — LETTER
Letter by Teresa Ortiz CNP at      Author: Teresa Ortiz CNP Service: -- Author Type: --    Filed:  Encounter Date: 9/20/2019 Status: (Other)         Rosa Maria Mehta  [address]  [place] [address]      September 20, 2019      Dear Rosa Maria    In reviewing your records, we have determined a gap in your preventive services. Based on your age and health history, we recommend the follow service.     ? Physical with a Pap Smear      If you have had the service elsewhere, please contact us so we can update our records. Please let us know if you have transferred your care to another clinic.    Please call [phone] to schedule this appointment.    We believe that a strong preventive care program, including regular physicals and follow-up care is an important part of a healthy lifestyle and we are committed to helping you maintain your health.    Thank you for choosing us as your health care provider.    Sincerely,     [facility]

## 2021-06-25 NOTE — TELEPHONE ENCOUNTER
RN cannot approve Refill Request    RN can NOT refill this medication Protocol failed and NO refill given. Last office visit: 11/24/2017 Teresa Ortiz CNP Last Physical: Visit date not found Last MTM visit: Visit date not found Last visit same specialty: 11/24/2017 Teresa Ortiz CNP.  Next visit within 3 mo: Visit date not found  Next physical within 3 mo: Visit date not found      Nicho Mcrae, [facility] Connection Triage/Med Refill 3/19/2019    Requested Prescriptions   Pending Prescriptions Disp Refills     PARoxetine (PAXIL) 30 MG tablet [Pharmacy Med Name: PAROXETINE HCL 30 MG TABLET] 180 tablet 2     Sig: TAKE 2 TABLETS BY MOUTH EVERY MORNING.    [facility] Refill Protocol  Failed - 3/17/2019  4:44 PM       Failed - PCP or prescribing provider visit in last year    Last office visit with prescriber/PCP: 11/24/2017 Teresa Ortiz CNP OR same dept: Visit date not found OR same specialty: 11/24/2017 Teresa Ortiz CNP  Last physical: Visit date not found Last MTM visit: Visit date not found   Next visit within 3 mo: Visit date not found  Next physical within 3 mo: Visit date not found  Prescriber OR PCP: Teresa Ortiz CNP  Last diagnosis associated with med order: 1. Major depressive disorder, recurrent episode (H)  - PARoxetine (PAXIL) 30 MG tablet [Pharmacy Med Name: PAROXETINE HCL 30 MG TABLET]; TAKE 2 TABLETS BY MOUTH EVERY MORNING.  Dispense: 180 tablet; Refill: 2    If protocol passes may refill for 12 months if within 3 months of last provider visit (or a total of 15 months).

## 2021-06-25 NOTE — TELEPHONE ENCOUNTER
Who is calling:   patient  Reason for Call:   Patient is out and has been for 3 days could she get enough till appointment on 4/16/2019  Please advise  Date of last appointment with primary care:  2017  Okay to leave a detailed message: Yes

## 2021-06-26 ENCOUNTER — HEALTH MAINTENANCE LETTER (OUTPATIENT)
Age: 41
End: 2021-06-26

## 2021-10-11 ENCOUNTER — HEALTH MAINTENANCE LETTER (OUTPATIENT)
Age: 41
End: 2021-10-11

## 2022-01-07 NOTE — TELEPHONE ENCOUNTER
RN Triage:    Has called twice this week to request refill of Paxil.  Has been out of medication for 4 days.  She feels dizzy, sweaty and has some diarrhea, which she thinks may be withdrawal symptoms of Paxil.  Has appointment in clinic for tomorrow morning.  Triage nurse attempted to call clinic, but staff had already left for the day.  She plans to come in tomorrow for OV.  She will call back for any worsening of symptoms.    Jane Escobar, RN   [facility]   None

## 2022-04-18 ENCOUNTER — OFFICE VISIT (OUTPATIENT)
Dept: FAMILY MEDICINE | Facility: CLINIC | Age: 42
End: 2022-04-18
Payer: COMMERCIAL

## 2022-04-18 ENCOUNTER — TELEPHONE (OUTPATIENT)
Dept: SURGERY | Facility: CLINIC | Age: 42
End: 2022-04-18

## 2022-04-18 VITALS
OXYGEN SATURATION: 99 % | HEART RATE: 82 BPM | HEIGHT: 67 IN | DIASTOLIC BLOOD PRESSURE: 108 MMHG | BODY MASS INDEX: 38.45 KG/M2 | WEIGHT: 245 LBS | SYSTOLIC BLOOD PRESSURE: 154 MMHG

## 2022-04-18 DIAGNOSIS — Z79.899 MEDICATION MANAGEMENT: ICD-10-CM

## 2022-04-18 DIAGNOSIS — Z13.220 LIPID SCREENING: ICD-10-CM

## 2022-04-18 DIAGNOSIS — Z12.31 ENCOUNTER FOR SCREENING MAMMOGRAM FOR BREAST CANCER: ICD-10-CM

## 2022-04-18 DIAGNOSIS — E66.09 CLASS 2 OBESITY DUE TO EXCESS CALORIES WITHOUT SERIOUS COMORBIDITY WITH BODY MASS INDEX (BMI) OF 38.0 TO 38.9 IN ADULT: ICD-10-CM

## 2022-04-18 DIAGNOSIS — Z00.00 ENCOUNTER FOR ROUTINE HISTORY AND PHYSICAL EXAM IN FEMALE: Primary | ICD-10-CM

## 2022-04-18 DIAGNOSIS — I10 BENIGN ESSENTIAL HYPERTENSION: ICD-10-CM

## 2022-04-18 DIAGNOSIS — E66.812 CLASS 2 OBESITY DUE TO EXCESS CALORIES WITHOUT SERIOUS COMORBIDITY WITH BODY MASS INDEX (BMI) OF 38.0 TO 38.9 IN ADULT: ICD-10-CM

## 2022-04-18 DIAGNOSIS — M54.16 LUMBAR RADICULOPATHY: ICD-10-CM

## 2022-04-18 DIAGNOSIS — B35.1 ONYCHOMYCOSIS: ICD-10-CM

## 2022-04-18 DIAGNOSIS — K62.89 MASS OF ANUS: ICD-10-CM

## 2022-04-18 DIAGNOSIS — F33.0 MILD EPISODE OF RECURRENT MAJOR DEPRESSIVE DISORDER (H): ICD-10-CM

## 2022-04-18 DIAGNOSIS — Z12.4 CERVICAL CANCER SCREENING: ICD-10-CM

## 2022-04-18 LAB
ALBUMIN SERPL-MCNC: 4.1 G/DL (ref 3.5–5)
ALP SERPL-CCNC: 91 U/L (ref 45–120)
ALT SERPL W P-5'-P-CCNC: 13 U/L (ref 0–45)
ANION GAP SERPL CALCULATED.3IONS-SCNC: 13 MMOL/L (ref 5–18)
AST SERPL W P-5'-P-CCNC: 16 U/L (ref 0–40)
ATRIAL RATE - MUSE: 71 BPM
BILIRUB SERPL-MCNC: 0.3 MG/DL (ref 0–1)
BUN SERPL-MCNC: 9 MG/DL (ref 8–22)
CALCIUM SERPL-MCNC: 9.7 MG/DL (ref 8.5–10.5)
CHLORIDE BLD-SCNC: 102 MMOL/L (ref 98–107)
CHOLEST SERPL-MCNC: 298 MG/DL
CO2 SERPL-SCNC: 26 MMOL/L (ref 22–31)
CREAT SERPL-MCNC: 0.81 MG/DL (ref 0.6–1.1)
DIASTOLIC BLOOD PRESSURE - MUSE: NORMAL MMHG
FASTING STATUS PATIENT QL REPORTED: YES
GFR SERPL CREATININE-BSD FRML MDRD: >90 ML/MIN/1.73M2
GLUCOSE BLD-MCNC: 101 MG/DL (ref 70–125)
HDLC SERPL-MCNC: 39 MG/DL
HGB BLD-MCNC: 13.4 G/DL (ref 11.7–15.7)
INTERPRETATION ECG - MUSE: NORMAL
LDLC SERPL CALC-MCNC: 209 MG/DL
LDLC SERPL CALC-MCNC: ABNORMAL MG/DL
P AXIS - MUSE: 30 DEGREES
POTASSIUM BLD-SCNC: 4.9 MMOL/L (ref 3.5–5)
PR INTERVAL - MUSE: 132 MS
PROT SERPL-MCNC: 7.6 G/DL (ref 6–8)
QRS DURATION - MUSE: 82 MS
QT - MUSE: 388 MS
QTC - MUSE: 421 MS
R AXIS - MUSE: 51 DEGREES
SODIUM SERPL-SCNC: 141 MMOL/L (ref 136–145)
SYSTOLIC BLOOD PRESSURE - MUSE: NORMAL MMHG
T AXIS - MUSE: 52 DEGREES
TRIGL SERPL-MCNC: 444 MG/DL
TSH SERPL DL<=0.005 MIU/L-ACNC: 4.41 UIU/ML (ref 0.3–5)
VENTRICULAR RATE- MUSE: 71 BPM

## 2022-04-18 PROCEDURE — 90471 IMMUNIZATION ADMIN: CPT | Performed by: NURSE PRACTITIONER

## 2022-04-18 PROCEDURE — G0123 SCREEN CERV/VAG THIN LAYER: HCPCS | Performed by: NURSE PRACTITIONER

## 2022-04-18 PROCEDURE — 87624 HPV HI-RISK TYP POOLED RSLT: CPT | Performed by: NURSE PRACTITIONER

## 2022-04-18 PROCEDURE — 93005 ELECTROCARDIOGRAM TRACING: CPT | Performed by: NURSE PRACTITIONER

## 2022-04-18 PROCEDURE — 99214 OFFICE O/P EST MOD 30 MIN: CPT | Mod: 25 | Performed by: NURSE PRACTITIONER

## 2022-04-18 PROCEDURE — 83721 ASSAY OF BLOOD LIPOPROTEIN: CPT | Mod: 59 | Performed by: NURSE PRACTITIONER

## 2022-04-18 PROCEDURE — 36415 COLL VENOUS BLD VENIPUNCTURE: CPT | Performed by: NURSE PRACTITIONER

## 2022-04-18 PROCEDURE — 93010 ELECTROCARDIOGRAM REPORT: CPT | Performed by: INTERNAL MEDICINE

## 2022-04-18 PROCEDURE — 85018 HEMOGLOBIN: CPT | Performed by: NURSE PRACTITIONER

## 2022-04-18 PROCEDURE — 99396 PREV VISIT EST AGE 40-64: CPT | Mod: 25 | Performed by: NURSE PRACTITIONER

## 2022-04-18 PROCEDURE — 80061 LIPID PANEL: CPT | Performed by: NURSE PRACTITIONER

## 2022-04-18 PROCEDURE — 80053 COMPREHEN METABOLIC PANEL: CPT | Performed by: NURSE PRACTITIONER

## 2022-04-18 PROCEDURE — 90715 TDAP VACCINE 7 YRS/> IM: CPT | Performed by: NURSE PRACTITIONER

## 2022-04-18 PROCEDURE — 84443 ASSAY THYROID STIM HORMONE: CPT | Performed by: NURSE PRACTITIONER

## 2022-04-18 RX ORDER — LISINOPRIL/HYDROCHLOROTHIAZIDE 10-12.5 MG
1 TABLET ORAL DAILY
Qty: 30 TABLET | Refills: 0 | Status: SHIPPED | OUTPATIENT
Start: 2022-04-18 | End: 2022-05-20

## 2022-04-18 RX ORDER — BUPROPION HYDROCHLORIDE 150 MG/1
TABLET ORAL
Qty: 90 TABLET | Refills: 3 | Status: SHIPPED | OUTPATIENT
Start: 2022-04-18 | End: 2023-05-11

## 2022-04-18 RX ORDER — PAROXETINE 30 MG/1
TABLET, FILM COATED ORAL
Qty: 180 TABLET | Refills: 3 | Status: SHIPPED | OUTPATIENT
Start: 2022-04-18 | End: 2023-05-11

## 2022-04-18 RX ORDER — METHOCARBAMOL 500 MG/1
500 TABLET, FILM COATED ORAL 3 TIMES DAILY PRN
Qty: 30 TABLET | Refills: 0 | Status: ON HOLD | OUTPATIENT
Start: 2022-04-18 | End: 2022-07-12

## 2022-04-18 ASSESSMENT — ANXIETY QUESTIONNAIRES
7. FEELING AFRAID AS IF SOMETHING AWFUL MIGHT HAPPEN: NOT AT ALL
6. BECOMING EASILY ANNOYED OR IRRITABLE: NOT AT ALL
3. WORRYING TOO MUCH ABOUT DIFFERENT THINGS: SEVERAL DAYS
7. FEELING AFRAID AS IF SOMETHING AWFUL MIGHT HAPPEN: NOT AT ALL
4. TROUBLE RELAXING: SEVERAL DAYS
2. NOT BEING ABLE TO STOP OR CONTROL WORRYING: NOT AT ALL
GAD7 TOTAL SCORE: 4
1. FEELING NERVOUS, ANXIOUS, OR ON EDGE: SEVERAL DAYS
GAD7 TOTAL SCORE: 4
5. BEING SO RESTLESS THAT IT IS HARD TO SIT STILL: SEVERAL DAYS
GAD7 TOTAL SCORE: 4

## 2022-04-18 ASSESSMENT — PATIENT HEALTH QUESTIONNAIRE - PHQ9
SUM OF ALL RESPONSES TO PHQ QUESTIONS 1-9: 3
SUM OF ALL RESPONSES TO PHQ QUESTIONS 1-9: 3
10. IF YOU CHECKED OFF ANY PROBLEMS, HOW DIFFICULT HAVE THESE PROBLEMS MADE IT FOR YOU TO DO YOUR WORK, TAKE CARE OF THINGS AT HOME, OR GET ALONG WITH OTHER PEOPLE: SOMEWHAT DIFFICULT

## 2022-04-18 NOTE — TELEPHONE ENCOUNTER
[facility] Call Center    Phone Message    May a detailed message be left on voicemail: yes     Reason for Call: Appointment Intake      Referring Provider Name: Teresa Ortiz APRN CNP in [facility] FAMILY [facility]/OB    Diagnosis and/or Symptoms: anal mass     Action Taken: Message routed to:  [facility] ([facility] Colorectal     Travel Screening: Not Applicable

## 2022-04-18 NOTE — TELEPHONE ENCOUNTER
Patient has external hemorrhoids, has had them for 18 years since she gave birth to her child. They are not painful, they bleed intermittently with bowel movements. Denies pain. Recently saw PCP and they were concerned with the amount of hemorrhoids, referral sent to r/o [facility]. She will see Tanja Prasad NP on 5/11 for examination.    Ani Meyer, RN BSN  [facility]al [facility]  [phone]

## 2022-04-18 NOTE — PROGRESS NOTES
Assessment and Plan:    Encounter for routine history and physical exam in female  Recommend consuming a healthy diet and exercising.  She declines HIV and hepatitis C screening.  Updated tetanus vaccine.  - Hemoglobin  - Hemoglobin    Encounter for screening mammogram for breast cancer  - *MA Screening Digital Bilateral    Lipid screening  - Lipid panel reflex to direct LDL Fasting  - Lipid panel reflex to direct LDL Fasting    Cervical cancer screening  - Pap Screen with HPV - recommended age 30 - 65 years    Mild episode of recurrent major depressive disorder (H)  Patient continues paroxetine and bupropion as prescribed.  - PARoxetine (PAXIL) 30 MG tablet  Dispense: 180 tablet; Refill: 3  - buPROPion (WELLBUTRIN XL) 150 MG 24 hr tablet  Dispense: 90 tablet; Refill: 3    Benign essential hypertension  This is uncontrolled.  We will discontinue hydrochlorothiazide.  We will start lisinopril-hydrochlorothiazide 10-12.5 mg daily.  Educated on its indications and side effects.  She is to follow-up for nurse only blood pressure recheck in 2 weeks.  EKG shows normal sinus rhythm with no acute changes.  - lisinopril-hydrochlorothiazide (ZESTORETIC) 10-12.5 MG tablet  Dispense: 30 tablet; Refill: 0  - EKG 12-lead, tracing only  - TSH with free T4 reflex  - TSH with free T4 reflex    Lumbar radiculopathy  Patient symptoms have improved.  She requests muscle relaxant to assist with future flares.  Provided prescription for methocarbamol to take as needed.  She is to avoid taking this with other sedatives.  If no improvement in symptoms, will refer to spine clinic for further evaluation and possible MRI.  - methocarbamol (ROBAXIN) 500 MG tablet  Dispense: 30 tablet; Refill: 0    Onychomycosis  Will refer to podiatry for further evaluation.  - Orthopedic  Referral    Mass of anus  Patient likely has multiple external rectal hemorrhoids, but this is rather large in size, so will refer to colorectal surgery for further  evaluation.  - Colorectal Surgery Referral    Class 2 obesity due to excess calories without serious comorbidity with body mass index (BMI) of 38.0 to 38.9 in adult  Recommend consuming a healthy diet and exercising.    Medication management  - Comprehensive metabolic panel (BMP + Alb, Alk Phos, ALT, AST, Total. Bili, TP)  - Comprehensive metabolic panel (BMP + Alb, Alk Phos, ALT, AST, Total. Bili, TP)        Subjective:     Rosa Maria is a 41 year old female presenting to the clinic for a female physical.     LMP: one week ago, regular once/month   Hx of abnormal pap smear: yes, at age 19 colposcopy, LEEP performed   Last pap smear: 10/19/12 normal, negative HPV   Perform self-breast exams: none   Vaginal discharge or irritation: none   Sexually active: yes,  for 20 years   Contraception:  with vasectomy   Concerns for STDs: none   Previous pregnancies:five pregnancies (1 , 1 miscarriage, and 3 live births all )  Kids are 12, 16, 18 (all boys)     Answers for HPI/ROS submitted by the patient on 2022  If you checked off any problems, how difficult have these problems made it for you to do your work, take care of things at home, or get along with other people?: Somewhat difficult  PHQ9 TOTAL SCORE: 3  JAZMYN 7 TOTAL SCORE: 4  Frequency of exercise:: 1 day/week  Getting at least 3 servings of Calcium per day:: NO  Diet:: Regular (no restrictions)  Taking medications regularly:: Yes  Medication side effects:: Not applicable  Bi-annual eye exam:: NO  Dental care twice a year:: Yes  Sleep apnea or symptoms of sleep apnea:: None  Additional concerns today:: No  Duration of exercise:: Less than 15 minutes    Patient has hypertension and is currently taking hydrochlorothiazide 12.5 mg daily.  She has been checking her blood pressure at home and it has not improved.  She denies headaches, blurry vision, chest pain, shortness of breath with exertion, edema, orthopnea, syncope.  She is trying to  consume a healthy diet.  She participates in minimal exercise.    Patient is taking paroxetine, bupropion, and hydroxyzine for anxiety and depression.  She feels as though her mood is stable.  She denies any side effects to the medication.  She denies thoughts of suicide.  She feels well supported.    Patient has been experiencing left lower lumbar and leg pain intermittently since January.  She does have a history of this in the past in which she thought it was sciatica.  Patient states the pain will radiate to her upper anterior thigh down to her calf muscle.  Pain has been severe enough that she has had difficulty ambulating.  She tries to stretch and apply heat and cold.  She denies any known injury.  She denies numbness and tingling in her extremities.  She has not had any urinary or fecal incontinence or retention.    She complains of thickening of her toenails and dry scaly skin on her feet.  She would like to see a podiatrist.    Review of systems:  I performed a 10 point review of systems.  All pertinent positives and negatives are noted in the HPI. All others are negative.     No Known Allergies    Current Outpatient Medications   Medication     buPROPion (WELLBUTRIN XL) 150 MG 24 hr tablet     hydrOXYzine (ATARAX) 25 MG tablet     lisinopril-hydrochlorothiazide (ZESTORETIC) 10-12.5 MG tablet     methocarbamol (ROBAXIN) 500 MG tablet     PARoxetine (PAXIL) 30 MG tablet     No current facility-administered medications for this visit.       Social History     Socioeconomic History     Marital status:      Spouse name: Not on file     Number of children: Not on file     Years of education: Not on file     Highest education level: Not on file   Occupational History     Not on file   Tobacco Use     Smoking status: Current Every Day Smoker     Packs/day: 0.50     Smokeless tobacco: Never Used   Substance and Sexual Activity     Alcohol use: No     Drug use: No     Sexual activity: Yes     Partners: Male  "    Comment:    Other Topics Concern     Not on file   Social History Narrative     Not on file     Social Determinants of Health     Financial Resource Strain: Not on file   Food Insecurity: Not on file   Transportation Needs: Not on file   Physical Activity: Not on file   Stress: Not on file   Social Connections: Not on file   Intimate Partner Violence: Not on file   Housing Stability: Not on file       History reviewed. No pertinent past medical history.    Family History   Problem Relation Age of Onset     Lung Cancer Mother      Thyroid Disease Mother      Throat cancer Father      Alcoholism Father      Hypertension Maternal Grandmother      Prostate Cancer Maternal Grandfather        Past Surgical History:   Procedure Laterality Date     C/SECTION, CLASSICAL       TEAR DUCT SURGERY         Objective:     BP (!) 154/108   Pulse 82   Ht 1.689 m (5' 6.5\")   Wt 111.1 kg (245 lb)   LMP 04/09/2022   SpO2 99%   BMI 38.95 kg/m      Patient is alert, no obvious distress.   Skin: Warm, dry.  No rashes or lesions. Skin turgor rapid return. Yellow thickening of all toenails are present.   HEENT:  Eyes normal.  Ears normal.  Nose patent, mucosa pink.  Oropharynx mucosa pink, no lesions or tonsil enlargement.   Neck:  Supple, without lymphadenopathy, bruits, JVD. Thyroid normal texture and size.    Lungs:  Clear to auscultation.  No wheezing, rales noted.  Respirations even and unlabored.   Heart:  Regular rate and rhythm.  No murmurs.   Breasts:  Normal.  No surrounding adenopathy.   Abdomen: Soft, nontender.  No organomegaly.  Bowel sounds normoactive.  No guarding or masses noted.   :  External genitalia normal.  Normal vaginal mucosa.  Cervix no lesions or cervical motion tenderness.   Rectal:  Large growth surrounds the anus.   Musculoskeletal:  Full ROM of extremities.  Muscle strength equal +5/5.   Neurological:  Cranial nerves 2-12 intact.      I ordered and personally reviewed an EKG showing normal " sinus rhythm.

## 2022-04-19 ASSESSMENT — PATIENT HEALTH QUESTIONNAIRE - PHQ9: SUM OF ALL RESPONSES TO PHQ QUESTIONS 1-9: 3

## 2022-04-19 ASSESSMENT — ANXIETY QUESTIONNAIRES: GAD7 TOTAL SCORE: 4

## 2022-04-19 NOTE — TELEPHONE ENCOUNTER
Diagnosis, Referred by & from: External Hemorrhoids, wants to rule out mass   Appt date: 5/11/2022   NOTES STATUS DETAILS   OFFICE NOTE from referring provider Internal Nita Hsieh:  4/18/22 - [facility] OV with Teresa Ortiz NP   OFFICE NOTE from other specialist N/A    DISCHARGE SUMMARY from hospital N/A    DISCHARGE REPORT from the ER N/A    OPERATIVE REPORT N/A    MEDICATION LIST Internal    LABS N/A    DIAGNOSTIC PROCEDURES N/A    IMAGING (DISC & REPORT) N/A

## 2022-04-22 LAB
HUMAN PAPILLOMA VIRUS 16 DNA: NEGATIVE
HUMAN PAPILLOMA VIRUS 18 DNA: NEGATIVE
HUMAN PAPILLOMA VIRUS FINAL DIAGNOSIS: NORMAL
HUMAN PAPILLOMA VIRUS OTHER HR: NEGATIVE

## 2022-04-25 ENCOUNTER — PATIENT OUTREACH (OUTPATIENT)
Dept: FAMILY MEDICINE | Facility: CLINIC | Age: 42
End: 2022-04-25
Payer: COMMERCIAL

## 2022-04-25 LAB
BKR LAB AP GYN ADEQUACY: NORMAL
BKR LAB AP GYN INTERPRETATION: NORMAL
BKR LAB AP HPV REFLEX: NORMAL
BKR LAB AP PREVIOUS ABNORMAL: NORMAL
PATH REPORT.COMMENTS IMP SPEC: NORMAL
PATH REPORT.COMMENTS IMP SPEC: NORMAL
PATH REPORT.RELEVANT HX SPEC: NORMAL

## 2022-05-10 ASSESSMENT — ENCOUNTER SYMPTOMS
MUSCLE WEAKNESS: 0
HYPOTENSION: 0
ORTHOPNEA: 0
PALPITATIONS: 0
NECK PAIN: 0
HEARTBURN: 0
SYNCOPE: 0
BOWEL INCONTINENCE: 0
MYALGIAS: 1
HYPERTENSION: 1
JOINT SWELLING: 0
CONSTIPATION: 0
JAUNDICE: 0
VOMITING: 1
LEG PAIN: 1
NAUSEA: 1
ABDOMINAL PAIN: 0
EXERCISE INTOLERANCE: 0
RECTAL PAIN: 0
DIARRHEA: 1
STIFFNESS: 0
ARTHRALGIAS: 0
MUSCLE CRAMPS: 0
BLOOD IN STOOL: 1
SLEEP DISTURBANCES DUE TO BREATHING: 0
BACK PAIN: 1
LIGHT-HEADEDNESS: 0
BLOATING: 0

## 2022-05-11 ENCOUNTER — OFFICE VISIT (OUTPATIENT)
Dept: SURGERY | Facility: CLINIC | Age: 42
End: 2022-05-11
Payer: COMMERCIAL

## 2022-05-11 ENCOUNTER — PRE VISIT (OUTPATIENT)
Dept: SURGERY | Facility: CLINIC | Age: 42
End: 2022-05-11
Payer: COMMERCIAL

## 2022-05-11 VITALS
BODY MASS INDEX: 37.84 KG/M2 | HEART RATE: 83 BPM | WEIGHT: 241.1 LBS | OXYGEN SATURATION: 100 % | HEIGHT: 67 IN | SYSTOLIC BLOOD PRESSURE: 140 MMHG | DIASTOLIC BLOOD PRESSURE: 96 MMHG

## 2022-05-11 DIAGNOSIS — K64.8 INTERNAL HEMORRHOIDS: ICD-10-CM

## 2022-05-11 DIAGNOSIS — K62.5 RECTAL BLEEDING: Primary | ICD-10-CM

## 2022-05-11 DIAGNOSIS — K64.4 ANAL SKIN TAG: ICD-10-CM

## 2022-05-11 PROCEDURE — 99203 OFFICE O/P NEW LOW 30 MIN: CPT | Mod: 25 | Performed by: NURSE PRACTITIONER

## 2022-05-11 PROCEDURE — 46600 DIAGNOSTIC ANOSCOPY SPX: CPT | Performed by: NURSE PRACTITIONER

## 2022-05-11 ASSESSMENT — PAIN SCALES - GENERAL: PAINLEVEL: NO PAIN (0)

## 2022-05-11 NOTE — LETTER
"2022       RE: Rosa Maria Mehta  [address]  [facility] [address]     Dear Colleague,    Thank you for referring your patient, Rosa Maria Mehta, to the [facility] COL[facility] [place] at [facility]. Please see a copy of my visit note below.    Colon and Rectal Surgery Consult Clinic Note    Date: 2022     Referring provider:  No referring provider defined for this encounter.     RE: Rosa Maria Mehta  : 1980  PONCHO: 2022    Rosa Maria Mehta is a very pleasant 41 year old female who presents today for hemorrhoids.    HPI:  Has had hemorrhoids for 18 years. They used to be more symptomatic but do not currently bother her unless she has a hard bowel movements. Was having a substantial amount of bleeding with bowel movements in the toilet and with wiping for a few months. Not associated with any pain. This last occurred about a month ago. She notices extra tissue that is always external. No tissue that needs to be manually reduced. No difficulty with bowel movements. No blood thinners.  No family history of colon cancer but she lost both of her parents at a young age. Thinks she had a colonoscopy when she was very young and maybe had polyps but is unsure.   Smokes about a half a pack a day. No anal intercourse.    Physical Examination:  BP (!) 140/96 (BP Location: Left arm, Patient Position: Sitting, Cuff Size: Adult Large)   Pulse 83   Ht 1.689 m (5' 6.5\")   Wt 109.4 kg (241 lb 1.6 oz)   LMP 2022   SpO2 100%   BMI 38.33 kg/m    General: alert, oriented, in no acute distress, sitting comfortably  HEENT: mucous membranes moist    Perianal external examination: Exam was chaperoned by Kim Hayes, EMT   Perianal skin: Intact with no excoriation or lichenification.  Lesions: No evidence of an external lesion, nodularity, or induration in the perianal region.  Eversion of buttocks: There was not evidence of an anal " fissure. Details: N/A.  Skin tags or external hemorrhoids: Yes: external hemorrhoidal skin tags.  Digital rectal examination: Was performed.   Sphincter tone: Good.  Palpable lesions: No.  Other: None.  Bimanual examination: was not performed    Anoscopy: Was performed.   Hemorrhoids: Yes. Grade 2 internal hemorrhoids without bleeding  Lesions: No    Assessment/Plan: 41 year old female with rectal bleeding, internal hemorrhoids, and anal skin tags.  She is currently asymptomatic but does have some internal hemorrhoids and external hemorrhoidal skin tags.  Since these were not symptomatic, I would not recommend any further intervention for these at the time.  However, since she did have several months of rectal bleeding I would recommend a colonoscopy to rule out other sources of bleeding.  Recommended a daily fiber supplement and we will have her return to clinic if bleeding returns and could consider hemorrhoid banding. Patient's questions were answered to her stated satisfaction and she is in agreement with this plan.    Medical history:  No past medical history on file.    Surgical history:  Past Surgical History:   Procedure Laterality Date     C/SECTION, CLASSICAL       LEEP TX, CERVICAL      at the age of 19     TEAR DUCT SURGERY         Problem list:    Patient Active Problem List    Diagnosis Date Noted     History of loop electrical excision procedure (LEEP) 04/25/2022     Priority: Medium     Hx of abnormal pap smear: yes, at age 19 colposcopy, LEEP performed   04/19/11 NIL Pap  10/19/12 NIL Pap, Neg HR HPV   04/18/22 NIL Pap, Neg HR HPV Plan cotest in 1 year due to hx of LEEP.          Anxiety 10/08/2020     Priority: Medium     Obesity (BMI 35.0-39.9) with comorbidity (H) 10/08/2020     Priority: Medium     Major Depression, Recurrent      Priority: Medium     Created by Conversion  Replacement Utility updated for latest IMO load         Vitamin D Deficiency      Priority: Medium     Created by  Conversion  Replacement Utility updated for latest IMO load         Easy bruising 10/27/2014     Priority: Medium     Pain During Urination (Dysuria)      Priority: Medium     Created by Conversion         Depression      Priority: Medium     Created by Conversion         Onychomycosis      Priority: Medium     Created by Conversion         Obesity      Priority: Medium     Created by Conversion         Fatigue      Priority: Medium     Created by Conversion         Hyperlipidemia      Priority: Medium     Created by Conversion           Medications:  Current Outpatient Medications   Medication Sig Dispense Refill     buPROPion (WELLBUTRIN XL) 150 MG 24 hr tablet [BUPROPION (WELLBUTRIN XL) 150 MG 24 HR TABLET] TAKE 1 TABLET BY MOUTH EVERY DAY 90 tablet 3     hydrOXYzine (ATARAX) 25 MG tablet [HYDROXYZINE (ATARAX) 25 MG TABLET] Take 1 tablet (25 mg total) by mouth daily as needed. 30 tablet 2     lisinopril-hydrochlorothiazide (ZESTORETIC) 10-12.5 MG tablet Take 1 tablet by mouth daily 30 tablet 0     methocarbamol (ROBAXIN) 500 MG tablet Take 1 tablet (500 mg) by mouth 3 times daily as needed for muscle spasms 30 tablet 0     PARoxetine (PAXIL) 30 MG tablet TAKE 2 TABS BY MOUTH ONCE DAILY IN THE MORNING 180 tablet 3       Allergies:  No Known Allergies    Family history:  Family History   Problem Relation Age of Onset     Lung Cancer Mother      Thyroid Disease Mother      Throat cancer Father      Alcoholism Father      Hypertension Maternal Grandmother      Prostate Cancer Maternal Grandfather        Social history:  Social History     Tobacco Use     Smoking status: Current Every Day Smoker     Packs/day: 0.50     Smokeless tobacco: Never Used   Substance Use Topics     Alcohol use: No    Marital status: .    Nursing Notes:   Kim Boss, EMT  5/11/2022  9:53 AM  Signed  Chief Complaint   Patient presents with     New Patient     External hemorrhoids       Vitals:    05/11/22 0951   BP: (!) 140/96  "  BP Location: Left arm   Patient Position: Sitting   Cuff Size: Adult Large   Pulse: 83   SpO2: 100%   Weight: 109.4 kg (241 lb 1.6 oz)   Height: 1.689 m (5' 6.5\")       Body mass index is 38.33 kg/m .    RENARD Nguyen      15 minutes spent on the date of encounter (excluding time performing procedures) performing chart review, history and exam, documentation and further activities as noted above with an additional 2 minutes for anoscopy.       TRAVIS Loaiza, NP-C  Colon and Rectal Surgery   [facility]      This note was created using speech recognition software and may contain unintended word substitutions.  "

## 2022-05-11 NOTE — NURSING NOTE
"Chief Complaint   Patient presents with     New Patient     External hemorrhoids       Vitals:    05/11/22 0951   BP: (!) 140/96   BP Location: Left arm   Patient Position: Sitting   Cuff Size: Adult Large   Pulse: 83   SpO2: 100%   Weight: 109.4 kg (241 lb 1.6 oz)   Height: 1.689 m (5' 6.5\")       Body mass index is 38.33 kg/m .                          Kim Boss, EMT    "

## 2022-05-11 NOTE — PROGRESS NOTES
"Colon and Rectal Surgery Consult Clinic Note    Date: 2022     Referring provider:  No referring provider defined for this encounter.     RE: Rosa Maria Mehta  : 1980  PONCHO: 2022    Rosa Maria Mehta is a very pleasant 41 year old female who presents today for hemorrhoids.    HPI:  Has had hemorrhoids for 18 years. They used to be more symptomatic but do not currently bother her unless she has a hard bowel movements. Was having a substantial amount of bleeding with bowel movements in the toilet and with wiping for a few months. Not associated with any pain. This last occurred about a month ago. She notices extra tissue that is always external. No tissue that needs to be manually reduced. No difficulty with bowel movements. No blood thinners.  No family history of colon cancer but she lost both of her parents at a young age. Thinks she had a colonoscopy when she was very young and maybe had polyps but is unsure.   Smokes about a half a pack a day. No anal intercourse.    Physical Examination:  BP (!) 140/96 (BP Location: Left arm, Patient Position: Sitting, Cuff Size: Adult Large)   Pulse 83   Ht 1.689 m (5' 6.5\")   Wt 109.4 kg (241 lb 1.6 oz)   LMP 2022   SpO2 100%   BMI 38.33 kg/m    General: alert, oriented, in no acute distress, sitting comfortably  HEENT: mucous membranes moist    Perianal external examination: Exam was chaperoned by RENARD Urias   Perianal skin: Intact with no excoriation or lichenification.  Lesions: No evidence of an external lesion, nodularity, or induration in the perianal region.  Eversion of buttocks: There was not evidence of an anal fissure. Details: N/A.  Skin tags or external hemorrhoids: Yes: external hemorrhoidal skin tags.  Digital rectal examination: Was performed.   Sphincter tone: Good.  Palpable lesions: No.  Other: None.  Bimanual examination: was not performed    Anoscopy: Was performed.   Hemorrhoids: Yes. Grade 2 internal hemorrhoids " without bleeding  Lesions: No    Assessment/Plan: 41 year old female with rectal bleeding, internal hemorrhoids, and anal skin tags.  She is currently asymptomatic but does have some internal hemorrhoids and external hemorrhoidal skin tags.  Since these were not symptomatic, I would not recommend any further intervention for these at the time.  However, since she did have several months of rectal bleeding I would recommend a colonoscopy to rule out other sources of bleeding.  Recommended a daily fiber supplement and we will have her return to clinic if bleeding returns and could consider hemorrhoid banding. Patient's questions were answered to her stated satisfaction and she is in agreement with this plan.    Medical history:  No past medical history on file.    Surgical history:  Past Surgical History:   Procedure Laterality Date     C/SECTION, CLASSICAL       LEEP TX, CERVICAL      at the age of 19     TEAR DUCT SURGERY         Problem list:    Patient Active Problem List    Diagnosis Date Noted     History of loop electrical excision procedure (LEEP) 04/25/2022     Priority: Medium     Hx of abnormal pap smear: yes, at age 19 colposcopy, LEEP performed   04/19/11 NIL Pap  10/19/12 NIL Pap, Neg HR HPV   04/18/22 NIL Pap, Neg HR HPV Plan cotest in 1 year due to hx of LEEP.          Anxiety 10/08/2020     Priority: Medium     Obesity (BMI 35.0-39.9) with comorbidity (H) 10/08/2020     Priority: Medium     Major Depression, Recurrent      Priority: Medium     Created by Conversion  Replacement Utility updated for latest IMO load         Vitamin D Deficiency      Priority: Medium     Created by Conversion  Replacement Utility updated for latest IMO load         Easy bruising 10/27/2014     Priority: Medium     Pain During Urination (Dysuria)      Priority: Medium     Created by Conversion         Depression      Priority: Medium     Created by Conversion         Onychomycosis      Priority: Medium     Created by  "Conversion         Obesity      Priority: Medium     Created by Conversion         Fatigue      Priority: Medium     Created by Conversion         Hyperlipidemia      Priority: Medium     Created by Conversion           Medications:  Current Outpatient Medications   Medication Sig Dispense Refill     buPROPion (WELLBUTRIN XL) 150 MG 24 hr tablet [BUPROPION (WELLBUTRIN XL) 150 MG 24 HR TABLET] TAKE 1 TABLET BY MOUTH EVERY DAY 90 tablet 3     hydrOXYzine (ATARAX) 25 MG tablet [HYDROXYZINE (ATARAX) 25 MG TABLET] Take 1 tablet (25 mg total) by mouth daily as needed. 30 tablet 2     lisinopril-hydrochlorothiazide (ZESTORETIC) 10-12.5 MG tablet Take 1 tablet by mouth daily 30 tablet 0     methocarbamol (ROBAXIN) 500 MG tablet Take 1 tablet (500 mg) by mouth 3 times daily as needed for muscle spasms 30 tablet 0     PARoxetine (PAXIL) 30 MG tablet TAKE 2 TABS BY MOUTH ONCE DAILY IN THE MORNING 180 tablet 3       Allergies:  No Known Allergies    Family history:  Family History   Problem Relation Age of Onset     Lung Cancer Mother      Thyroid Disease Mother      Throat cancer Father      Alcoholism Father      Hypertension Maternal Grandmother      Prostate Cancer Maternal Grandfather        Social history:  Social History     Tobacco Use     Smoking status: Current Every Day Smoker     Packs/day: 0.50     Smokeless tobacco: Never Used   Substance Use Topics     Alcohol use: No    Marital status: .    Nursing Notes:   Kim Boss, EMT  5/11/2022  9:53 AM  Signed  Chief Complaint   Patient presents with     New Patient     External hemorrhoids       Vitals:    05/11/22 0951   BP: (!) 140/96   BP Location: Left arm   Patient Position: Sitting   Cuff Size: Adult Large   Pulse: 83   SpO2: 100%   Weight: 109.4 kg (241 lb 1.6 oz)   Height: 1.689 m (5' 6.5\")       Body mass index is 38.33 kg/m .                          Kim Boss, EMT         15 minutes spent on the date of encounter (excluding time performing " procedures) performing chart review, history and exam, documentation and further activities as noted above with an additional 2 minutes for anoscopy.     TRAVIS Loaiza, NP-C  Colon and Rectal Surgery   [facility]    This note was created using speech recognition software and may contain unintended word substitutions.

## 2022-05-17 DIAGNOSIS — I10 BENIGN ESSENTIAL HYPERTENSION: ICD-10-CM

## 2022-05-20 RX ORDER — LISINOPRIL/HYDROCHLOROTHIAZIDE 10-12.5 MG
TABLET ORAL
Qty: 30 TABLET | Refills: 0 | Status: SHIPPED | OUTPATIENT
Start: 2022-05-20 | End: 2022-06-26

## 2022-06-02 ENCOUNTER — TELEPHONE (OUTPATIENT)
Dept: GASTROENTEROLOGY | Facility: CLINIC | Age: 42
End: 2022-06-02
Payer: COMMERCIAL

## 2022-06-02 NOTE — TELEPHONE ENCOUNTER
Screening Questions  BlueKIND OF PREP RedLOCATION [review exclusion criteria] GreenSEDATION TYPE      1. Are you able to give consent for your medical care? Do you have a legal guardian or medical Power of ?  Yes (Sedation review/consideration needed)    2. Are you active on mychart? Yes    3. What insurance is in the chart? BCBS     3.   Ordering/Referring Provider: get Arcos    4. BMI 34.5 [BMI OVER 40-EXTENDED PREP]  If greater than 40 review exclusion criteria [PAC APPT IF @ [facility]]        5.  Respiratory Screening :  [If yes to any of the following HOSPITAL setting only]     Do you use daily home oxygen? N    Do you have mod to severe Obstructive Sleep Apnea? N  [OKAY @ [facility] SH PH RI]   Do you have Pulmonary Hypertension? N     Do you have UNCONTROLLED asthma? N        6.   Have you had a heart or lung transplant? N      7.   Are you currently on dialysis? N [ If yes, G-PREP & HOSPITAL setting only]     8.   Do you have chronic kidney disease? N [ If yes, G-PREP ]    9.   Have you had a stroke or Transient ischemic attack (TIA - aka  mini stroke ) within 6 months?  N (If yes, please review exclusion criteria)    10.   In the past 6 months, have you had any heart related issues including cardiomyopathy or heart attack? N           If yes, did it require cardiac stenting or other implantable device? N      11.   Do you have any implantable devices in your body (pacemaker, defib, LVAD)? N (If yes, please review exclusion criteria)    12.   Do you take nitroglycerin? N           If yes, how often? NA  (if yes, HOSPITAL setting ONLY)    13.   Are you currently taking any blood thinners? N           [IF YES, INFORM PATIENT TO FOLLOW UP W/ ORDERING PROVIDER FOR BRIDGING INSTRUCTIONS]     14.   Do you have a diagnosis of diabetes? N   [ If yes, G-PREP ]    15.   [FEMALES] Are you currently pregnant? N    If yes, how many weeks? NA    16.   Are you taking any prescription pain medications on a  routine schedule?  N  [ If yes, EXTENDED PREP.] [If yes, MAC]    17.   Do you have any chemical dependencies such as alcohol, street drugs, or methadone?  N [If yes, MAC]    18.   Do you have any history of post-traumatic stress syndrome, severe anxiety or history of psychosis?  N  [If yes, MAC]    19.   Do you transfer independently?  Yes    20.  On a regular basis do you go 3-5 days between bowel movements? Yes [ If yes, EXTENDED PREP.]    21.   Preferred LOCAL Pharmacy for Pre Prescription   [id number] [place] [address]      Scheduling Details      Caller : Rosa Maria  (Please ask for phone number if not scheduled by patient)    Type of Procedure Scheduled: Colon  Which Colonoscopy Prep was Sent?: Extended  [facility]ORUTS CF PATIENTS & GROEN'S PATIENTS REQUIRE EXTENDED PREP  Surgeon: RICCI Quiroga  Date of Procedure: 7/12/22  Location:       Sedation Type: CS  Conscious Sedation- Needs  for 6 hours after the procedure  MAC/General-Needs  for 24 hours after procedure    Pre-op Required at [facility], [place], [place] and OR for MAC sedation: NA  (advise patient they will need a pre-op prior to procedure -)      Informed patient they will need an adult  Yes  Cannot take any type of public or medical transportation alone    Pre-Procedure Covid test to be completed at [facility] or Externally: At home    Confirmed Nurse will call to complete assessment Yes    Additional comments:

## 2022-06-24 DIAGNOSIS — I10 BENIGN ESSENTIAL HYPERTENSION: ICD-10-CM

## 2022-06-25 NOTE — TELEPHONE ENCOUNTER
"Routing refill request to provider for review/approval because:  bp out of range    Last Written Prescription Date:  5/20/22  Last Fill Quantity: 30,  # refills: 0   Last office visit provider:  4/18/22     Requested Prescriptions   Pending Prescriptions Disp Refills     lisinopril-hydrochlorothiazide (ZESTORETIC) 10-12.5 MG tablet [Pharmacy Med Name: LISINOPRIL-HCTZ 10-12.5 MG TAB] 30 tablet 0     Sig: TAKE 1 TABLET BY MOUTH EVERY DAY       Diuretics (Including Combos) Protocol Failed - 6/24/2022 12:32 PM        Failed - Blood pressure under 140/90 in past 12 months     BP Readings from Last 3 Encounters:   05/11/22 (!) 140/96   04/18/22 (!) 154/108                 Passed - Recent (12 mo) or future (30 days) visit within the authorizing provider's specialty     Patient has had an office visit with the authorizing provider or a provider within the authorizing providers department within the previous 12 mos or has a future within next 30 days. See \"Patient Info\" tab in inbasket, or \"Choose Columns\" in Meds & Orders section of the refill encounter.              Passed - Medication is active on med list        Passed - Patient is age 18 or older        Passed - No active pregancy on record        Passed - Normal serum creatinine on file in past 12 months     Recent Labs   Lab Test 04/18/22  1225   CR 0.81              Passed - Normal serum potassium on file in past 12 months     Recent Labs   Lab Test 04/18/22  1225   POTASSIUM 4.9                    Passed - Normal serum sodium on file in past 12 months     Recent Labs   Lab Test 04/18/22  1225                 Passed - No positive pregnancy test in past 12 months       ACE Inhibitors (Including Combos) Protocol Failed - 6/24/2022 12:32 PM        Failed - Blood pressure under 140/90 in past 12 months     BP Readings from Last 3 Encounters:   05/11/22 (!) 140/96   04/18/22 (!) 154/108                 Passed - Recent (12 mo) or future (30 days) visit within the " "authorizing provider's specialty     Patient has had an office visit with the authorizing provider or a provider within the authorizing providers department within the previous 12 mos or has a future within next 30 days. See \"Patient Info\" tab in inbasket, or \"Choose Columns\" in Meds & Orders section of the refill encounter.              Passed - Medication is active on med list        Passed - Patient is age 18 or older        Passed - No active pregnancy on record        Passed - Normal serum creatinine on file in past 12 months     Recent Labs   Lab Test 04/18/22  1225   CR 0.81       Ok to refill medication if creatinine is low          Passed - Normal serum potassium on file in past 12 months     Recent Labs   Lab Test 04/18/22  1225   POTASSIUM 4.9             Passed - No positive pregnancy test within past 12 months             Marquita De La Cruz RN 06/24/22 8:50 PM  "

## 2022-06-26 RX ORDER — LISINOPRIL/HYDROCHLOROTHIAZIDE 10-12.5 MG
TABLET ORAL
Qty: 30 TABLET | Refills: 0 | Status: SHIPPED | OUTPATIENT
Start: 2022-06-26 | End: 2022-07-22

## 2022-06-28 ENCOUNTER — OFFICE VISIT (OUTPATIENT)
Dept: FAMILY MEDICINE | Facility: CLINIC | Age: 42
End: 2022-06-28
Payer: COMMERCIAL

## 2022-06-28 VITALS
SYSTOLIC BLOOD PRESSURE: 140 MMHG | WEIGHT: 240.5 LBS | HEART RATE: 76 BPM | BODY MASS INDEX: 38.24 KG/M2 | DIASTOLIC BLOOD PRESSURE: 86 MMHG

## 2022-06-28 DIAGNOSIS — M54.16 LUMBAR RADICULOPATHY: Primary | ICD-10-CM

## 2022-06-28 PROCEDURE — 99214 OFFICE O/P EST MOD 30 MIN: CPT | Performed by: FAMILY MEDICINE

## 2022-06-28 RX ORDER — PREDNISONE 20 MG/1
TABLET ORAL
Qty: 18 TABLET | Refills: 0 | Status: SHIPPED | OUTPATIENT
Start: 2022-06-28 | End: 2022-08-16

## 2022-06-28 RX ORDER — HYDROCODONE BITARTRATE AND ACETAMINOPHEN 5; 325 MG/1; MG/1
1-2 TABLET ORAL EVERY 6 HOURS PRN
Status: ON HOLD | COMMUNITY
Start: 2022-06-13 | End: 2022-07-12

## 2022-06-28 RX ORDER — GABAPENTIN 300 MG/1
300 CAPSULE ORAL AT BEDTIME
Qty: 30 CAPSULE | Refills: 0 | Status: SHIPPED | OUTPATIENT
Start: 2022-06-28 | End: 2022-08-07

## 2022-06-28 NOTE — PROGRESS NOTES
Assessment & Plan     Lumbar radiculopathy  Progressively worsening symptoms over the last 6 months with leg paresthesia.  I will give her a course of prednisone and try her on gabapentin.  Avoid weightbearing activities.  Await MRI result.  She may need to see orthopedics pending results  - MR Lumbar Spine w/o Contrast  - gabapentin (NEURONTIN) 300 MG capsule  Dispense: 30 capsule; Refill: 0  - predniSONE (DELTASONE) 20 MG tablet  Dispense: 18 tablet; Refill: 0      Donta Tobin MD  [facility]    Jonh Crane is a 41 year old presenting for the following health issues:  Back Pain (Lower left side x for months, have tried muscle relaxer before and did not help, saw Ortho )      HPI     The patient was in her normal state of health until 6 months ago she was walking from a store after shopping to her car and noted pain of the left lower back and the buttocks.  Since then the pain has moved down to include the left leg.  She has pain and weakness of the left leg.  No bowel bladder dysfunction.  Pain is constant.  She has 10 out of 10 seen frequently specially when she bears weight in addition to constant pain.  She sought out treatment from a chiropractor 2 weeks ago.  She was then referred to [facility] orthopedics and was given a course of Tylenol with codeine and methylprednisone.  She has not noted any relief.  She has been also taking Aleve and Robaxin.    She has never had surgery to her back previously.  However, she said that she has had a couple similar episodes in the past but these episodes were short-lived at the time and were less intense.        Objective    BP (!) 140/86   Pulse 76   Wt 109.1 kg (240 lb 8 oz)   LMP 06/08/2022   BMI 38.24 kg/m    Body mass index is 38.24 kg/m .  Physical Exam     Constitutional: Patient is oriented to person, place, and time. Patient appears well-developed and well-nourished. No distress.   Head: Normocephalic and  atraumatic.   Right Ear: External ear normal.   Left Ear: External ear normal.   Eyes: Conjunctivae and EOM are normal. Right eye exhibits no discharge. Left eye exhibits no discharge. No scleral icterus.   Neurological: Patient is alert and oriented to person, place, and time.  Skin: No rash noted. Patient is not diaphoretic. No erythema. No pallor.  Patient had difficulty rising from a sitting position.  She walks with antalgic gait.

## 2022-06-29 ENCOUNTER — TELEPHONE (OUTPATIENT)
Dept: GASTROENTEROLOGY | Facility: CLINIC | Age: 42
End: 2022-06-29

## 2022-06-29 DIAGNOSIS — K62.5 RECTAL BLEEDING: Primary | ICD-10-CM

## 2022-06-29 RX ORDER — BISACODYL 5 MG/1
TABLET, DELAYED RELEASE ORAL
Qty: 2 TABLET | Refills: 0 | Status: SHIPPED | OUTPATIENT
Start: 2022-06-29 | End: 2022-08-16

## 2022-07-12 ENCOUNTER — HOSPITAL ENCOUNTER (OUTPATIENT)
Facility: CLINIC | Age: 42
Discharge: HOME | End: 2022-07-12
Attending: INTERNAL MEDICINE | Admitting: INTERNAL MEDICINE
Payer: COMMERCIAL

## 2022-07-12 VITALS
BODY MASS INDEX: 34.98 KG/M2 | SYSTOLIC BLOOD PRESSURE: 121 MMHG | WEIGHT: 220 LBS | DIASTOLIC BLOOD PRESSURE: 78 MMHG | HEART RATE: 65 BPM | OXYGEN SATURATION: 100 % | RESPIRATION RATE: 16 BRPM

## 2022-07-12 LAB — COLONOSCOPY: NORMAL

## 2022-07-12 PROCEDURE — 45380 COLONOSCOPY AND BIOPSY: CPT | Performed by: INTERNAL MEDICINE

## 2022-07-12 PROCEDURE — G0500 MOD SEDAT ENDO SERVICE >5YRS: HCPCS | Performed by: INTERNAL MEDICINE

## 2022-07-12 PROCEDURE — 250N000011 HC RX IP 250 OP 636: Performed by: INTERNAL MEDICINE

## 2022-07-12 PROCEDURE — 88305 TISSUE EXAM BY PATHOLOGIST: CPT | Mod: TC | Performed by: INTERNAL MEDICINE

## 2022-07-12 PROCEDURE — 88305 TISSUE EXAM BY PATHOLOGIST: CPT | Mod: 26 | Performed by: PATHOLOGY

## 2022-07-12 RX ORDER — PROCHLORPERAZINE MALEATE 10 MG
10 TABLET ORAL EVERY 6 HOURS PRN
Status: CANCELLED | OUTPATIENT
Start: 2022-07-12

## 2022-07-12 RX ORDER — NALOXONE HYDROCHLORIDE 0.4 MG/ML
0.4 INJECTION, SOLUTION INTRAMUSCULAR; INTRAVENOUS; SUBCUTANEOUS
Status: CANCELLED | OUTPATIENT
Start: 2022-07-12

## 2022-07-12 RX ORDER — ONDANSETRON 2 MG/ML
4 INJECTION INTRAMUSCULAR; INTRAVENOUS EVERY 6 HOURS PRN
Status: CANCELLED | OUTPATIENT
Start: 2022-07-12

## 2022-07-12 RX ORDER — NALOXONE HYDROCHLORIDE 0.4 MG/ML
0.2 INJECTION, SOLUTION INTRAMUSCULAR; INTRAVENOUS; SUBCUTANEOUS
Status: CANCELLED | OUTPATIENT
Start: 2022-07-12

## 2022-07-12 RX ORDER — DIPHENHYDRAMINE HYDROCHLORIDE 50 MG/ML
INJECTION INTRAMUSCULAR; INTRAVENOUS PRN
Status: COMPLETED | OUTPATIENT
Start: 2022-07-12 | End: 2022-07-12

## 2022-07-12 RX ORDER — ONDANSETRON 4 MG/1
4 TABLET, ORALLY DISINTEGRATING ORAL EVERY 6 HOURS PRN
Status: CANCELLED | OUTPATIENT
Start: 2022-07-12

## 2022-07-12 RX ORDER — FENTANYL CITRATE 50 UG/ML
INJECTION, SOLUTION INTRAMUSCULAR; INTRAVENOUS PRN
Status: COMPLETED | OUTPATIENT
Start: 2022-07-12 | End: 2022-07-12

## 2022-07-12 RX ORDER — FLUMAZENIL 0.1 MG/ML
0.2 INJECTION, SOLUTION INTRAVENOUS
Status: CANCELLED | OUTPATIENT
Start: 2022-07-12 | End: 2022-07-13

## 2022-07-12 RX ADMIN — FENTANYL CITRATE 100 MCG: 50 INJECTION, SOLUTION INTRAMUSCULAR; INTRAVENOUS at 12:59

## 2022-07-12 RX ADMIN — MIDAZOLAM 1 MG: 1 INJECTION INTRAMUSCULAR; INTRAVENOUS at 13:01

## 2022-07-12 RX ADMIN — MIDAZOLAM 2 MG: 1 INJECTION INTRAMUSCULAR; INTRAVENOUS at 12:59

## 2022-07-12 RX ADMIN — DIPHENHYDRAMINE HYDROCHLORIDE 25 MG: 50 INJECTION, SOLUTION INTRAMUSCULAR; INTRAVENOUS at 12:59

## 2022-07-12 RX ADMIN — FENTANYL CITRATE 25 MCG: 50 INJECTION, SOLUTION INTRAMUSCULAR; INTRAVENOUS at 13:01

## 2022-07-12 NOTE — H&P
Rosa Maria MEDARDO Mehta  [id number]  female  41 year old      Reason for procedure/surgery: rectal bleeding    Patient Active Problem List   Diagnosis     Depression     Pain During Urination (Dysuria)     Onychomycosis     Obesity     Major Depression, Recurrent     Fatigue     Vitamin D Deficiency     Hyperlipidemia     Easy bruising     Anxiety     Obesity (BMI 35.0-39.9) with comorbidity (H)     History of loop electrical excision procedure (LEEP)       Past Surgical History:    Past Surgical History:   Procedure Laterality Date     C/SECTION, CLASSICAL       LEEP TX, CERVICAL      at the age of 19     TEAR DUCT SURGERY         Past Medical History:   Past Medical History:   Diagnosis Date     Hypertension        Social History:   Social History     Tobacco Use     Smoking status: Current Every Day Smoker     Packs/day: 0.50     Smokeless tobacco: Never Used   Substance Use Topics     Alcohol use: No       Family History:   Family History   Problem Relation Age of Onset     Lung Cancer Mother      Thyroid Disease Mother      Throat cancer Father      Alcoholism Father      Hypertension Maternal Grandmother      Prostate Cancer Maternal Grandfather        Allergies: No Known Allergies    Active Medications:   No current outpatient medications on file.       Systemic Review:   CONSTITUTIONAL: NEGATIVE for fever, chills, change in weight  ENT/MOUTH: NEGATIVE for ear, mouth and throat problems  RESP: NEGATIVE for significant cough or SOB  CV: NEGATIVE for chest pain, palpitations or peripheral edema    Physical Examination:   Vital Signs: BP (!) 146/85   Pulse 81   Resp 16   Wt 99.8 kg (220 lb)   LMP 06/08/2022   SpO2 97%   BMI 34.98 kg/m    GENERAL: healthy, alert and no distress  NECK: no adenopathy, no asymmetry, masses, or scars  RESP: lungs clear to auscultation - no rales, rhonchi or wheezes  CV: regular rate and rhythm, normal S1 S2, no S3 or S4, no murmur, click or rub, no peripheral edema and peripheral  pulses strong  ABDOMEN: soft, nontender, no hepatosplenomegaly, no masses and bowel sounds normal  MS: no gross musculoskeletal defects noted, no edema    ASA: 2    Mallampati Score: 2    Plan: Appropriate to proceed as scheduled.      Brenton Quiroga MD  7/12/2022    PCP:  Teresa Ortiz

## 2022-07-13 LAB
PATH REPORT.COMMENTS IMP SPEC: NORMAL
PATH REPORT.COMMENTS IMP SPEC: NORMAL
PATH REPORT.FINAL DX SPEC: NORMAL
PATH REPORT.GROSS SPEC: NORMAL
PATH REPORT.MICROSCOPIC SPEC OTHER STN: NORMAL
PATH REPORT.RELEVANT HX SPEC: NORMAL
PHOTO IMAGE: NORMAL

## 2022-07-18 ENCOUNTER — HOSPITAL ENCOUNTER (OUTPATIENT)
Dept: MRI IMAGING | Facility: CLINIC | Age: 42
Discharge: HOME | End: 2022-07-18
Attending: FAMILY MEDICINE | Admitting: FAMILY MEDICINE
Payer: COMMERCIAL

## 2022-07-18 DIAGNOSIS — M54.16 LUMBAR RADICULOPATHY: ICD-10-CM

## 2022-07-18 PROCEDURE — 72148 MRI LUMBAR SPINE W/O DYE: CPT

## 2022-07-20 DIAGNOSIS — M51.26 LUMBAR HERNIATED DISC: Primary | ICD-10-CM

## 2022-07-21 DIAGNOSIS — I10 BENIGN ESSENTIAL HYPERTENSION: ICD-10-CM

## 2022-07-22 RX ORDER — LISINOPRIL/HYDROCHLOROTHIAZIDE 10-12.5 MG
1 TABLET ORAL DAILY
Qty: 90 TABLET | Refills: 2 | Status: SHIPPED | OUTPATIENT
Start: 2022-07-22 | End: 2022-10-14

## 2022-07-22 NOTE — TELEPHONE ENCOUNTER
"Last Written Prescription Date:  6/26/22  Last Fill Quantity: 30,  # refills: 0   Last office visit provider:  6/28/22     Requested Prescriptions   Pending Prescriptions Disp Refills     lisinopril-hydrochlorothiazide (ZESTORETIC) 10-12.5 MG tablet [Pharmacy Med Name: LISINOPRIL-HCTZ 10-12.5 MG TAB] 30 tablet 0     Sig: TAKE 1 TABLET BY MOUTH EVERY DAY       Diuretics (Including Combos) Protocol Passed - 7/22/2022 10:38 AM        Passed - Blood pressure under 140/90 in past 12 months     BP Readings from Last 3 Encounters:   07/12/22 121/78   06/28/22 (!) 140/86   05/11/22 (!) 140/96                 Passed - Recent (12 mo) or future (30 days) visit within the authorizing provider's specialty     Patient has had an office visit with the authorizing provider or a provider within the authorizing providers department within the previous 12 mos or has a future within next 30 days. See \"Patient Info\" tab in inbasket, or \"Choose Columns\" in Meds & Orders section of the refill encounter.              Passed - Medication is active on med list        Passed - Patient is age 18 or older        Passed - No active pregancy on record        Passed - Normal serum creatinine on file in past 12 months     Recent Labs   Lab Test 04/18/22  1225   CR 0.81              Passed - Normal serum potassium on file in past 12 months     Recent Labs   Lab Test 04/18/22  1225   POTASSIUM 4.9                    Passed - Normal serum sodium on file in past 12 months     Recent Labs   Lab Test 04/18/22  1225                 Passed - No positive pregnancy test in past 12 months       ACE Inhibitors (Including Combos) Protocol Passed - 7/22/2022 10:38 AM        Passed - Blood pressure under 140/90 in past 12 months     BP Readings from Last 3 Encounters:   07/12/22 121/78   06/28/22 (!) 140/86   05/11/22 (!) 140/96                 Passed - Recent (12 mo) or future (30 days) visit within the authorizing provider's specialty     Patient has had " "an office visit with the authorizing provider or a provider within the authorizing providers department within the previous 12 mos or has a future within next 30 days. See \"Patient Info\" tab in inbasket, or \"Choose Columns\" in Meds & Orders section of the refill encounter.              Passed - Medication is active on med list        Passed - Patient is age 18 or older        Passed - No active pregnancy on record        Passed - Normal serum creatinine on file in past 12 months     Recent Labs   Lab Test 04/18/22  1225   CR 0.81       Ok to refill medication if creatinine is low          Passed - Normal serum potassium on file in past 12 months     Recent Labs   Lab Test 04/18/22  1225   POTASSIUM 4.9             Passed - No positive pregnancy test within past 12 months             Gerald Corral RN 07/22/22 10:38 AM  "

## 2022-08-04 DIAGNOSIS — M54.16 LUMBAR RADICULOPATHY: ICD-10-CM

## 2022-08-07 RX ORDER — GABAPENTIN 300 MG/1
CAPSULE ORAL
Qty: 30 CAPSULE | Refills: 0 | Status: SHIPPED | OUTPATIENT
Start: 2022-08-07 | End: 2022-09-06

## 2022-08-12 DIAGNOSIS — M54.9 BACK PAIN: Primary | ICD-10-CM

## 2022-08-16 ENCOUNTER — OFFICE VISIT (OUTPATIENT)
Dept: NEUROSURGERY | Facility: CLINIC | Age: 42
End: 2022-08-16
Attending: FAMILY MEDICINE
Payer: COMMERCIAL

## 2022-08-16 VITALS
OXYGEN SATURATION: 99 % | SYSTOLIC BLOOD PRESSURE: 123 MMHG | HEART RATE: 80 BPM | HEIGHT: 67 IN | WEIGHT: 220 LBS | DIASTOLIC BLOOD PRESSURE: 72 MMHG | BODY MASS INDEX: 34.53 KG/M2

## 2022-08-16 DIAGNOSIS — M51.26 LUMBAR HERNIATED DISC: ICD-10-CM

## 2022-08-16 PROCEDURE — 99204 OFFICE O/P NEW MOD 45 MIN: CPT | Performed by: SURGERY

## 2022-08-16 NOTE — PROGRESS NOTES
NEUROSURGERY CONSULTATION NOTE      Neurosurgery was asked to see this patient by Donta Vaca The* for evaluation of lumbar radiculopathy.       CONSULTATION ASSESSMENT AND PLAN:    42 yo female who presents with a left S1 radiculopathy. Diminished S1 reflex on left on exam. Lumbar xray shows no significant instability. MRI of her lumbar spine shows a large cranially directed disc herniation at lumbar 5-sacral 1 which causes severe canal stenosis with R>L lateral recess narrowing and compression of bilaterally S1 nerve roots. The extrusion is more to the left but the broad based disc in central/right paracentral. Recommend left possible right lumbar 5-sacral 1 microdiscectomy. Risks and benefits of lumbar decompression discussed including but not limited to infection, hematoma, recurrent disc herniation, nerve damage including paralysis, post op radiculitis, durotomy, risks associated with the use of general anesthesia, blood clots in the lungs or legs. She agreed to proceed.     I spent more than 45 minutes in this apt, examining the pt, reviewing the scans, reviewing notes from chart, discussing treatment options with risks and benefits and coordinating care.     Tanja Mccoy MD      HPI:  42 yo female who presents with low back and left leg pain and tinglng. Last year she had a few episodes of back feeling like it would go out. Which was weird for her because she never felt like that before. She then noted more severe on her left lower back. At first it would always go away. Then in January she was laying flat on her back and noted increased low buttock and leg pain then progressively worsened fast and did not resolve. Currently having constant pain which will intermittently become debilitating. Pain located in left buttock and down posterior leg. Also gets tingling. Symptoms worsened after laying in bed all night. Improved with sleeping in her recliner. Doesn't feel weak.  No bowel or bladder  dysfunction.     Chiropractor- no resolution  Given spine exercises to do at home but cannot complete due to stiffness and pain. Limited motion.   Tria urgency room- prednisone and opioids without relief  Gabapentin and repeat steroids- some relief  Aleve - some relief     Past Medical History:   Diagnosis Date     Hypertension        Past Surgical History:   Procedure Laterality Date     C/SECTION, CLASSICAL       COLONOSCOPY N/A 7/12/2022    Procedure: COLONOSCOPY, WITH POLYPECTOMY AND BIOPSY;  Surgeon: Brenton Quiroga MD;  Location:  GI     LEEP TX, CERVICAL      at the age of 19     TEAR DUCT SURGERY         REVIEW OF SYSTEMS:  See ROS form under media     MEDICATIONS:  Current Outpatient Medications   Medication Sig Dispense Refill     bisacodyl (DULCOLAX) 5 MG EC tablet Take as directed. One day prior to exam at 10:00am take 2 tablets 2 tablet 0     buPROPion (WELLBUTRIN XL) 150 MG 24 hr tablet [BUPROPION (WELLBUTRIN XL) 150 MG 24 HR TABLET] TAKE 1 TABLET BY MOUTH EVERY DAY 90 tablet 3     gabapentin (NEURONTIN) 300 MG capsule TAKE 1 CAPSULE BY MOUTH AT BEDTIME 30 capsule 0     hydrOXYzine (ATARAX) 25 MG tablet [HYDROXYZINE (ATARAX) 25 MG TABLET] Take 1 tablet (25 mg total) by mouth daily as needed. 30 tablet 2     lisinopril-hydrochlorothiazide (ZESTORETIC) 10-12.5 MG tablet Take 1 tablet by mouth daily 90 tablet 2     PARoxetine (PAXIL) 30 MG tablet TAKE 2 TABS BY MOUTH ONCE DAILY IN THE MORNING 180 tablet 3     predniSONE (DELTASONE) 20 MG tablet 60mg x 3d, 40mg x 3d, 20mg x 3d 18 tablet 0         ALLERGIES/SENSITIVITIES:     No Known Allergies    PERTINENT SOCIAL HISTORY:   Social History     Socioeconomic History     Marital status:    Tobacco Use     Smoking status: Current Every Day Smoker     Packs/day: 0.50     Smokeless tobacco: Never Used   Substance and Sexual Activity     Alcohol use: No     Drug use: No     Sexual activity: Yes     Partners: Male     Comment:          FAMILY  "HISTORY:  Family History   Problem Relation Age of Onset     Lung Cancer Mother      Thyroid Disease Mother      Throat cancer Father      Alcoholism Father      Hypertension Maternal Grandmother      Prostate Cancer Maternal Grandfather         PHYSICAL EXAM:   Constitutional: /72   Pulse 80   Ht 5' 6.5\" (1.689 m)   Wt 220 lb (99.8 kg)   SpO2 99%   BMI 34.98 kg/m       Mental Status: A & O in no acute distress.  Affect is appropriate.  Speech is fluent.  Recent and remote memory are intact.  Attention span and concentration are normal.     Motor:  Normal bulk and tone all muscle groups of upper and lower extremities.    Strength: 5/5 x 4     Sensory: Sensation intact bilaterally to light touch.     Coordination: Heel/toe gait intact.  Normal gait and station.     Reflexes:  supinator, biceps, triceps, knee/ ankle jerk intact- diminished left ankle jerk. No mcdonnell's    IMAGING:  I personally reviewed all radiographic images         Cc:   Teresa Ortiz           "

## 2022-08-16 NOTE — PATIENT INSTRUCTIONS
LUMBAR MICRODISKECTOMY L5-S1 ON THE LEFT AND POSSIBLY RIGHT.     Please review COMPLETE information about your proposed surgery, pre-operative requirements, post-operative course and expectations - available in a folder provided to you in clinic!    Your surgery scheduler will call you to begin the process of scheduling your surgery and appointments.     Pre-Operative    Pre-operative physical / Lab work with primary care physician within 30 days of surgical date. We will assist you in scheduling this.    If all pre-op appointments/test are not completed prior to your surgery date, you will be asked to reschedule your surgery.           As part of preparation for your upcoming procedure you are required to have a test for the novel Coronavirus/COVID-19.  The test needs to be completed within 4 days (96) hours of surgery.   We will assist you in scheduling this.   You may NOT receive the COVID-19 vaccine or booster 2 weeks before or after surgery.    Readiness Visits    Prior to surgery, you may have a telephone or in person readiness visit with our RN team to discuss your upcomming surgery, results of your pre-op physical, and lab work.   If you will require a collar/neck brace after surgery, you will be fitted for one at your readiness visit prior to surgery (scheduled by the surgery scheduler).     Shower procedure    Hibiclens shower: Use one packet the night before surgery and one packet the morning of surgery for a whole body shower. Avoid face, hair, and genitals.      Eating/Drinking    Stop all solid foods 8 hours before surgery.  Keep drinking clear liquids until 4 hours before surgery  Clear liquids include water, clear juice, black coffee, or clear tea without milk, Gatorade, clear soda.     Medications - please refer to the pre-operative medication instructions sheet in your folder    Hold Aspirin, NSAIDs (Advil/Ibuprofen, Indocin, Naproxen,Nuprin,Relafen/Nabumetone, Diclofenac,Meloxicam, Aleve,  Celebrex) and all vitamins and supplements x 7-10 days prior to surgical date  You can take Tylenol (Acetaminophen) for pain up until the date of your surgery   Do not exceed 3,000 mg per day   Any other medications prescribed, please discuss with your primary care provider at your pre-operative physical. Please discuss when/if it is safe for you to stop taking blood thinners with your primary care provider.   We will NOT provide pain medications prior to surgery. We will prescribe post-op pain medications for up to 6 weeks after surgery.       FMLA/Short-term disability    If you are currently employed, you will likely need to be off work for 4-6 weeks for post-op recovery and healing.  Please fax any FMLA/short term disability paperwork to [phone], mail it into the clinic, drop it off in person, or send via a [employer] message.   You may also call our clinic with the date in which you'd like to return to work, and we can provide a work letter to your employer  We will support Short-Term Disability up to 12 weeks, beginning the date of your surgery. We do NOT support Long-Term Disability/Social Security Benefits.     Pain Management after surgery    Dealing with pain    As your body heals, you might feel a stabbing, burning, or aching pain. You may also have some numbness.  Everyone feels pain differently, we may ask you to rate your pain using a pain scale. This will let us know how much pain you feel.   Keep in mind that medicine won't take away all of your pain. It helps to try other ways to relax and ease pain.     Things to help with pain    After surgery, we will give you medicine for your pain. These medications work well, but they can make you drowsy, itchy, or sick to your stomach, and constipated. Try to take narcotics with food if they cause nausea.   For mild to moderate pain, you can take medication such as Tylenol or Ibuprofen. These can be used with narcotics and muscle relaxants. *If you have had  a fusion: do NOT use NSAIDs for 6 months after surgery.   Do NOT drive while taking narcotic pain medication  Do NOT drink alcohol while using narcotic pain medication  You can utilize ice as needed (no longer than 20 minutes at one time) you may apply this over your covered incision  Utilize heat for muscle spasms, do not apply heat over your incision  If a muscle relaxer is prescribed, please do NOT take it at the same time as your narcotic pain medication. Take them at least 90 minutes apart.   You may also use pain cream/patches on sore muscles. Do NOT apply these on your incision. Patches may be cut in 1/2 if needed.     *After your surgery, if you will be staying in-patient, a nursing team will be monitoring you closely throughout your stay and communicate your health status to your surgeon and other providers.  You will be seen by Advanced Practice Providers (e.g., nurse practitioners, clinic nurse specialists, and physician assistants) who will check on you regularly to assess the status of your surgical recovery.     Incision Care    Look at your incision site every day. You  may need a mirror, or family member to help you.   Do not submerge your incision in water such as pools, hot tubs, baths for at least 6 weeks or until incision is healed  You may get your incision wet in the shower. Allow water and soap to run over incision, and gently pat dry.   Remove the dressing the day after you are discharged from the hospital. Keep the incision clean and dry at all times. This may require several bandage changes.   Contact us right away if you have:   Fever over 101 degrees farenheit  Green or yellow drainage (pus) from your incision or increased bloody drainage   Redness, swelling, or warmth at your surgery site   Notify the clinic [phone].    Activity Restrictions    For the first 6 weeks, no lifting,pushing, or pulling > 5-10 pounds, no bending, twisting.  Use the stairs in moderation   Walking: Walking  is the best way to start exercise after surgery. Take short frequent walks. You may gradually increase the distance as tolerated. If you feel pain, decrease your activity, but DO NOT stop walking. Walking will help you regain strength.  Avoid prolonged positioning for longer than 30 minutes (change positions frequently while awake)  No contact sports until after follow up visit  No high impact activities such as; running/jogging, snowmobile or 4 brown riding or any other recreational vehicles until deemed safe by your surgeon/care team.   Please call the clinic if you develop any of the following symptoms:  Swelling and/or warmth in one or both legs  Pain or tenderness in your leg, ankle, foot, or arm   Red or discolored/pale skin     Post-Op Follow Up Appointments    We will call you to schedule these appointments after your discharge from the hospital.   Incision assessment within 2 weeks with a Registered Nurse   6 week post-op with a Nurse Practitioner/Physician Assistant. Your surgeon will be available on this day.

## 2022-08-16 NOTE — LETTER
8/16/2022         RE: Rosa Maria Mehta  [address]  [place] [address]        Dear Colleague,    Thank you for referring your patient, Rosa Maria Mehta, to the [facility] NEUROSURGERY CLINIC [facility]. Please see a copy of my visit note below.    NEUROSURGERY CONSULTATION NOTE      Neurosurgery was asked to see this patient by Donta Vaca The* for evaluation of lumbar radiculopathy.       CONSULTATION ASSESSMENT AND PLAN:    42 yo female who presents with a left S1 radiculopathy. Diminished S1 reflex on left on exam. Lumbar xray shows no significant instability. MRI of her lumbar spine shows a large cranially directed disc herniation at lumbar 5-sacral 1 which causes severe canal stenosis with R>L lateral recess narrowing and compression of bilaterally S1 nerve roots. The extrusion is more to the left but the broad based disc in central/right paracentral. Recommend left possible right lumbar 5-sacral 1 microdiscectomy. Risks and benefits of lumbar decompression discussed including but not limited to infection, hematoma, recurrent disc herniation, nerve damage including paralysis, post op radiculitis, durotomy, risks associated with the use of general anesthesia, blood clots in the lungs or legs. She agreed to proceed.     I spent more than 45 minutes in this apt, examining the pt, reviewing the scans, reviewing notes from chart, discussing treatment options with risks and benefits and coordinating care.     Tanja Mccoy MD      HPI:  42 yo female who presents with low back and left leg pain and tinglng. Last year she had a few episodes of back feeling like it would go out. Which was weird for her because she never felt like that before. She then noted more severe on her left lower back. At first it would always go away. Then in January she was laying flat on her back and noted increased low buttock and leg pain then progressively worsened fast and did not resolve. Currently having  constant pain which will intermittently become debilitating. Pain located in left buttock and down posterior leg. Also gets tingling. Symptoms worsened after laying in bed all night. Improved with sleeping in her recliner. Doesn't feel weak.  No bowel or bladder dysfunction.     Chiropractor- no resolution  Given spine exercises to do at home but cannot complete due to stiffness and pain. Limited motion.   Tria urgency room- prednisone and opioids without relief  Gabapentin and repeat steroids- some relief  Aleve - some relief     Past Medical History:   Diagnosis Date     Hypertension        Past Surgical History:   Procedure Laterality Date     C/SECTION, CLASSICAL       COLONOSCOPY N/A 7/12/2022    Procedure: COLONOSCOPY, WITH POLYPECTOMY AND BIOPSY;  Surgeon: Brenton Quiroga MD;  Location:  GI     LEEP TX, CERVICAL      at the age of 19     TEAR DUCT SURGERY         REVIEW OF SYSTEMS:  See ROS form under media     MEDICATIONS:  Current Outpatient Medications   Medication Sig Dispense Refill     bisacodyl (DULCOLAX) 5 MG EC tablet Take as directed. One day prior to exam at 10:00am take 2 tablets 2 tablet 0     buPROPion (WELLBUTRIN XL) 150 MG 24 hr tablet [BUPROPION (WELLBUTRIN XL) 150 MG 24 HR TABLET] TAKE 1 TABLET BY MOUTH EVERY DAY 90 tablet 3     gabapentin (NEURONTIN) 300 MG capsule TAKE 1 CAPSULE BY MOUTH AT BEDTIME 30 capsule 0     hydrOXYzine (ATARAX) 25 MG tablet [HYDROXYZINE (ATARAX) 25 MG TABLET] Take 1 tablet (25 mg total) by mouth daily as needed. 30 tablet 2     lisinopril-hydrochlorothiazide (ZESTORETIC) 10-12.5 MG tablet Take 1 tablet by mouth daily 90 tablet 2     PARoxetine (PAXIL) 30 MG tablet TAKE 2 TABS BY MOUTH ONCE DAILY IN THE MORNING 180 tablet 3     predniSONE (DELTASONE) 20 MG tablet 60mg x 3d, 40mg x 3d, 20mg x 3d 18 tablet 0         ALLERGIES/SENSITIVITIES:     No Known Allergies    PERTINENT SOCIAL HISTORY:   Social History     Socioeconomic History     Marital status:   "  Tobacco Use     Smoking status: Current Every Day Smoker     Packs/day: 0.50     Smokeless tobacco: Never Used   Substance and Sexual Activity     Alcohol use: No     Drug use: No     Sexual activity: Yes     Partners: Male     Comment:          FAMILY HISTORY:  Family History   Problem Relation Age of Onset     Lung Cancer Mother      Thyroid Disease Mother      Throat cancer Father      Alcoholism Father      Hypertension Maternal Grandmother      Prostate Cancer Maternal Grandfather         PHYSICAL EXAM:   Constitutional: /72   Pulse 80   Ht 5' 6.5\" (1.689 m)   Wt 220 lb (99.8 kg)   SpO2 99%   BMI 34.98 kg/m       Mental Status: A & O in no acute distress.  Affect is appropriate.  Speech is fluent.  Recent and remote memory are intact.  Attention span and concentration are normal.     Motor:  Normal bulk and tone all muscle groups of upper and lower extremities.    Strength: 5/5 x 4     Sensory: Sensation intact bilaterally to light touch.     Coordination: Heel/toe gait intact.  Normal gait and station.     Reflexes:  supinator, biceps, triceps, knee/ ankle jerk intact- diminished left ankle jerk. No mcdonnell's    IMAGING:  I personally reviewed all radiographic images         Cc:   Teresa Ortiz               Again, thank you for allowing me to participate in the care of your patient.        Sincerely,        Tanja Mccoy MD    "

## 2022-08-16 NOTE — NURSING NOTE
Neurosurgery consultation was requested by: Dr. Ermelinda Tobin   Pain: left low back pain   Radicular Pain is present: left buttock and posterio/lateral leg pain   Lhermitte sign: no  Motor complaints: denies weakness   Sensory complaints: numbness/tingling in left foot   Gait and balance issues: yes   Bowel or bladder issues: denies   Duration of SX is:   The symptoms are worse with: activity   The symptoms are better with: rest   Injury: denies   Severity is: mild - moderate   Patient has tried the following conservative measures: none   MILDRED score is:46%  SHANE Alonzo

## 2022-08-17 ENCOUNTER — TELEPHONE (OUTPATIENT)
Dept: NEUROSURGERY | Facility: CLINIC | Age: 42
End: 2022-08-17

## 2022-08-24 NOTE — TELEPHONE ENCOUNTER
Called and answered all questions related to luis alberto-operative recovery in detail to her satisfaction. She voted to proceed with surgery scheduling process.  Hina Vickers RN, CNRN

## 2022-08-24 NOTE — TELEPHONE ENCOUNTER
Patient has questions regarding surgery prior to scheduling surgery. Would like someone to call her.

## 2022-09-06 DIAGNOSIS — M54.16 LUMBAR RADICULOPATHY: ICD-10-CM

## 2022-09-06 RX ORDER — GABAPENTIN 300 MG/1
CAPSULE ORAL
Qty: 30 CAPSULE | Refills: 0 | Status: SHIPPED | OUTPATIENT
Start: 2022-09-06 | End: 2023-09-28

## 2022-09-06 NOTE — TELEPHONE ENCOUNTER
Routing refill request to provider for review/approval because:  Drug not on the [facility] refill protocol     Last Written Prescription Date:  8/7/22  Last Fill Quantity: 30,  # refills: 0   Last office visit provider:  6/28/22     Requested Prescriptions   Pending Prescriptions Disp Refills     gabapentin (NEURONTIN) 300 MG capsule [Pharmacy Med Name: GABAPENTIN 300 MG CAPSULE] 30 capsule 0     Sig: TAKE 1 CAPSULE BY MOUTH EVERYDAY AT BEDTIME       There is no refill protocol information for this order          Gerald Corral RN 09/06/22 1:02 PM

## 2022-09-13 ENCOUNTER — PREP FOR PROCEDURE (OUTPATIENT)
Dept: NEUROSURGERY | Facility: CLINIC | Age: 42
End: 2022-09-13

## 2022-09-13 DIAGNOSIS — Z01.818 PRE-OP TESTING: Primary | ICD-10-CM

## 2022-09-13 DIAGNOSIS — M54.16 LUMBAR RADICULOPATHY: Primary | ICD-10-CM

## 2022-09-13 DIAGNOSIS — M51.26 LUMBAR DISC HERNIATION: ICD-10-CM

## 2022-09-19 ENCOUNTER — TELEPHONE (OUTPATIENT)
Dept: NEUROSURGERY | Facility: CLINIC | Age: 42
End: 2022-09-19

## 2022-09-19 NOTE — TELEPHONE ENCOUNTER
Patient would like surgery and pre-sug requirements / labs change to 10/17/22.  Would a call back.  Best contact is [phone].

## 2022-09-25 ENCOUNTER — HEALTH MAINTENANCE LETTER (OUTPATIENT)
Age: 42
End: 2022-09-25

## 2022-09-28 RX ORDER — HYDROCHLOROTHIAZIDE 12.5 MG/1
TABLET ORAL
Qty: 90 TABLET | Refills: 3 | OUTPATIENT
Start: 2022-09-28

## 2022-09-28 NOTE — TELEPHONE ENCOUNTER
Outpatient Medication Detail     Disp Refills Start End KANA   hydroCHLOROthiazide (HYDRODIURIL) 12.5 MG tablet (Discontinued) 90 tablet 3 1/19/2021 4/18/2022 No   Sig - Route: [HYDROCHLOROTHIAZIDE (HYDRODIURIL) 12.5 MG TABLET] Take 1 tablet (12.5 mg total) by mouth daily. - Oral   Class: Normal   Order: [id number]

## 2022-10-14 ENCOUNTER — OFFICE VISIT (OUTPATIENT)
Dept: FAMILY MEDICINE | Facility: CLINIC | Age: 42
End: 2022-10-14
Payer: COMMERCIAL

## 2022-10-14 ENCOUNTER — LAB (OUTPATIENT)
Dept: LAB | Facility: CLINIC | Age: 42
End: 2022-10-14
Payer: COMMERCIAL

## 2022-10-14 VITALS
HEART RATE: 64 BPM | BODY MASS INDEX: 37.57 KG/M2 | RESPIRATION RATE: 18 BRPM | OXYGEN SATURATION: 97 % | HEIGHT: 66 IN | WEIGHT: 233.8 LBS | SYSTOLIC BLOOD PRESSURE: 120 MMHG | DIASTOLIC BLOOD PRESSURE: 70 MMHG

## 2022-10-14 DIAGNOSIS — F33.0 MILD EPISODE OF RECURRENT MAJOR DEPRESSIVE DISORDER (H): ICD-10-CM

## 2022-10-14 DIAGNOSIS — F41.9 ANXIETY: ICD-10-CM

## 2022-10-14 DIAGNOSIS — Z01.818 PRE-OP EXAM: Primary | ICD-10-CM

## 2022-10-14 DIAGNOSIS — I10 BENIGN ESSENTIAL HYPERTENSION: ICD-10-CM

## 2022-10-14 DIAGNOSIS — M54.16 LUMBAR RADICULOPATHY: ICD-10-CM

## 2022-10-14 DIAGNOSIS — E78.2 MIXED HYPERLIPIDEMIA: ICD-10-CM

## 2022-10-14 DIAGNOSIS — Z01.818 PRE-OP TESTING: ICD-10-CM

## 2022-10-14 DIAGNOSIS — E66.01 MORBID OBESITY (H): ICD-10-CM

## 2022-10-14 LAB
ANION GAP SERPL CALCULATED.3IONS-SCNC: 12 MMOL/L (ref 7–15)
APTT PPP: 27 SECONDS (ref 22–38)
BUN SERPL-MCNC: 14.3 MG/DL (ref 6–20)
CALCIUM SERPL-MCNC: 10.2 MG/DL (ref 8.6–10)
CHLORIDE SERPL-SCNC: 103 MMOL/L (ref 98–107)
CREAT SERPL-MCNC: 0.75 MG/DL (ref 0.51–0.95)
DEPRECATED HCO3 PLAS-SCNC: 25 MMOL/L (ref 22–29)
ERYTHROCYTE [DISTWIDTH] IN BLOOD BY AUTOMATED COUNT: 13.9 % (ref 10–15)
GFR SERPL CREATININE-BSD FRML MDRD: >90 ML/MIN/1.73M2
GLUCOSE SERPL-MCNC: 91 MG/DL (ref 70–99)
HCG UR QL: NEGATIVE
HCT VFR BLD AUTO: 36 % (ref 35–47)
HGB BLD-MCNC: 11.7 G/DL (ref 11.7–15.7)
HOLD SPECIMEN: NORMAL
HOLD SPECIMEN: NORMAL
INR PPP: 0.92 (ref 0.85–1.15)
MCH RBC QN AUTO: 27.2 PG (ref 26.5–33)
MCHC RBC AUTO-ENTMCNC: 32.5 G/DL (ref 31.5–36.5)
MCV RBC AUTO: 84 FL (ref 78–100)
PLATELET # BLD AUTO: 278 10E3/UL (ref 150–450)
POTASSIUM SERPL-SCNC: 4.7 MMOL/L (ref 3.4–5.3)
RBC # BLD AUTO: 4.3 10E6/UL (ref 3.8–5.2)
SODIUM SERPL-SCNC: 140 MMOL/L (ref 136–145)
WBC # BLD AUTO: 7.8 10E3/UL (ref 4–11)

## 2022-10-14 PROCEDURE — 90471 IMMUNIZATION ADMIN: CPT | Performed by: NURSE PRACTITIONER

## 2022-10-14 PROCEDURE — 85610 PROTHROMBIN TIME: CPT

## 2022-10-14 PROCEDURE — 36415 COLL VENOUS BLD VENIPUNCTURE: CPT

## 2022-10-14 PROCEDURE — 99214 OFFICE O/P EST MOD 30 MIN: CPT | Mod: 25 | Performed by: NURSE PRACTITIONER

## 2022-10-14 PROCEDURE — 81025 URINE PREGNANCY TEST: CPT | Performed by: NURSE PRACTITIONER

## 2022-10-14 PROCEDURE — 90686 IIV4 VACC NO PRSV 0.5 ML IM: CPT | Performed by: NURSE PRACTITIONER

## 2022-10-14 PROCEDURE — 85027 COMPLETE CBC AUTOMATED: CPT

## 2022-10-14 PROCEDURE — 80048 BASIC METABOLIC PNL TOTAL CA: CPT

## 2022-10-14 PROCEDURE — 85730 THROMBOPLASTIN TIME PARTIAL: CPT

## 2022-10-14 RX ORDER — LISINOPRIL AND HYDROCHLOROTHIAZIDE 20; 25 MG/1; MG/1
1 TABLET ORAL DAILY
Qty: 90 TABLET | Refills: 3 | Status: SHIPPED | OUTPATIENT
Start: 2022-10-14 | End: 2023-09-28

## 2022-10-14 ASSESSMENT — PATIENT HEALTH QUESTIONNAIRE - PHQ9
10. IF YOU CHECKED OFF ANY PROBLEMS, HOW DIFFICULT HAVE THESE PROBLEMS MADE IT FOR YOU TO DO YOUR WORK, TAKE CARE OF THINGS AT HOME, OR GET ALONG WITH OTHER PEOPLE: NOT DIFFICULT AT ALL
SUM OF ALL RESPONSES TO PHQ QUESTIONS 1-9: 1
SUM OF ALL RESPONSES TO PHQ QUESTIONS 1-9: 1

## 2022-10-14 ASSESSMENT — PAIN SCALES - GENERAL: PAINLEVEL: MODERATE PAIN (4)

## 2022-10-14 NOTE — H&P (VIEW-ONLY)
[facility]  [address]  [facility] [address]  Phone: [phone]  Fax: [phone]  Primary Provider: Teresa Ortiz  Pre-op Performing Provider: TERESA ORTIZ      PREOPERATIVE EVALUATION:  Today's date: 10/14/2022    Rosa Maria Mehta is a 41 year old female who presents for a preoperative evaluation.    Surgical Information:  Surgery/Procedure: microdiscectomy   Surgery Location: [facility]   Surgeon: Dr. Mccoy   Surgery Date: 10/21/22  Time of Surgery: 10:00 am  Where patient plans to recover: At home with family  Fax number for surgical facility:     Type of Anesthesia Anticipated: to be determined    Assessment & Plan     The proposed surgical procedure is considered INTERMEDIATE risk.    Pre-op exam  Pre-op exam performed. There are no contraindications for surgery.  She is to avoid taking NSAID's for 7 days prior to surgery.    - HCG qualitative urine; Future    Lumbar radiculopathy  Recommend OTC Tylenol as needed for pain.  She continues Gabapentin.      Benign essential hypertension  This is controlled.  She will hold lisinopril-hydrochlorothiazide the morning of surgery.   - lisinopril-hydrochlorothiazide (ZESTORETIC) 20-25 MG tablet; Take 1 tablet by mouth daily    Anxiety  Mild episode of recurrent major depressive disorder (H)  She continues Paroxetine, Hydroxyzine, and Bupropion.     Mixed hyperlipidemia  She is not currently taking medication.     Obesity (BMI 35.0-39.9) with comorbidity (H)  Recommend consuming a healthy diet and exercising. This is contributing to hypertension.         RECOMMENDATION:  APPROVAL GIVEN to proceed with proposed procedure, without further diagnostic evaluation.        Subjective     HPI related to upcoming procedure: Patient has chronic lumbar pain.  She developed constant pain within her left lower extremity in January.  She states that twisting motions, sitting, and bending over exacerbates the pain.  She feels as though  nothing assists the pain.  She has been taking gabapentin. Lumbar xray showed no significant instability. MRI of her lumbar spine shows a large cranially directed disc herniation at lumbar 5-sacral 1 which causes severe canal stenosis with R>L lateral recess narrowing and compression of bilaterally S1 nerve roots. The extrusion is more to the left but the broad based disc in central/right paracentral.     Patient has hypertension and has been taking lisinopril-hydrochlorothiazide 20-25 mg daily.  She has depression and anxiety which is controlled with paroxetine, bupropion, and hydroxyzine.    Answers for HPI/ROS submitted by the patient on 10/14/2022  If you checked off any problems, how difficult have these problems made it for you to do your work, take care of things at home, or get along with other people?: Not difficult at all  PHQ9 TOTAL SCORE: 1      Preop Questions 10/14/2022   1. Have you ever had a heart attack or stroke? No   2. Have you ever had surgery on your heart or blood vessels, such as a stent placement, a coronary artery bypass, or surgery on an artery in your head, neck, heart, or legs? No   3. Do you have chest pain with activity? No   4. Do you have a history of  heart failure? No   5. Do you currently have a cold, bronchitis or symptoms of other infection? No   6. Do you have a cough, shortness of breath, or wheezing? No   7. Do you or anyone in your family have previous history of blood clots? No   8. Do you or does anyone in your family have a serious bleeding problem such as prolonged bleeding following surgeries or cuts? No   9. Have you ever had problems with anemia or been told to take iron pills? No   10. Have you had any abnormal blood loss such as black, tarry or bloody stools, or abnormal vaginal bleeding? No   11. Have you ever had a blood transfusion? No   12. Are you willing to have a blood transfusion if it is medically needed before, during, or after your surgery? Yes   13.  Have you or any of your relatives ever had problems with anesthesia? No   14. Do you have sleep apnea, excessive snoring or daytime drowsiness? No   15. Do you have any artifical heart valves or other implanted medical devices like a pacemaker, defibrillator, or continuous glucose monitor? No   16. Do you have artificial joints? No   17. Are you allergic to latex? No   18. Is there any chance that you may be pregnant? No       Health Care Directive:  Patient does not have a Health Care Directive or Living Will: Discussed advance care planning with patient; information given to patient to review.    Preoperative Review of :   reviewed - controlled substances reflected in medication list.      Status of Chronic Conditions:  See problem list for active medical problems.  Problems all longstanding and stable, except as noted/documented.  See ROS for pertinent symptoms related to these conditions.      Review of Systems  Constitutional, neuro, ENT, endocrine, pulmonary, cardiac, gastrointestinal, genitourinary, musculoskeletal, integument and psychiatric systems are negative, except as otherwise noted.    Patient Active Problem List    Diagnosis Date Noted     History of loop electrical excision procedure (LEEP) 04/25/2022     Priority: Medium     Hx of abnormal pap smear: yes, at age 19 colposcopy, LEEP performed   04/19/11 NIL Pap  10/19/12 NIL Pap, Neg HR HPV   04/18/22 NIL Pap, Neg HR HPV Plan cotest in 1 year due to hx of LEEP.          Anxiety 10/08/2020     Priority: Medium     Obesity (BMI 35.0-39.9) with comorbidity (H) 10/08/2020     Priority: Medium     Major Depression, Recurrent      Priority: Medium     Created by Conversion  Replacement Utility updated for latest IMO load         Vitamin D Deficiency      Priority: Medium     Created by Conversion  Replacement Utility updated for latest IMO load         Easy bruising 10/27/2014     Priority: Medium     Pain During Urination (Dysuria)      Priority:  Medium     Created by Conversion         Depression      Priority: Medium     Created by Conversion         Onychomycosis      Priority: Medium     Created by Conversion         Obesity      Priority: Medium     Created by Conversion         Fatigue      Priority: Medium     Created by Conversion         Hyperlipidemia      Priority: Medium     Created by Conversion          Past Medical History:   Diagnosis Date     Hypertension      Past Surgical History:   Procedure Laterality Date     C/SECTION, CLASSICAL       COLONOSCOPY N/A 7/12/2022    Procedure: COLONOSCOPY, WITH POLYPECTOMY AND BIOPSY;  Surgeon: Brenton Quiroga MD;  Location:  GI     LEEP TX, CERVICAL      at the age of 19     TEAR DUCT SURGERY       Current Outpatient Medications   Medication Sig Dispense Refill     buPROPion (WELLBUTRIN XL) 150 MG 24 hr tablet [BUPROPION (WELLBUTRIN XL) 150 MG 24 HR TABLET] TAKE 1 TABLET BY MOUTH EVERY DAY 90 tablet 3     gabapentin (NEURONTIN) 300 MG capsule TAKE 1 CAPSULE BY MOUTH EVERYDAY AT BEDTIME 30 capsule 0     hydrOXYzine (ATARAX) 25 MG tablet [HYDROXYZINE (ATARAX) 25 MG TABLET] Take 1 tablet (25 mg total) by mouth daily as needed. 30 tablet 2     lisinopril-hydrochlorothiazide (ZESTORETIC) 10-12.5 MG tablet Take 1 tablet by mouth daily 90 tablet 2     PARoxetine (PAXIL) 30 MG tablet TAKE 2 TABS BY MOUTH ONCE DAILY IN THE MORNING 180 tablet 3       No Known Allergies     Social History     Tobacco Use     Smoking status: Every Day     Packs/day: 0.50     Types: Cigarettes     Smokeless tobacco: Never   Substance Use Topics     Alcohol use: No     Family History   Problem Relation Age of Onset     Lung Cancer Mother      Thyroid Disease Mother      Throat cancer Father      Alcoholism Father      Hypertension Maternal Grandmother      Prostate Cancer Maternal Grandfather      History   Drug Use No         Objective     /70 (BP Location: Left arm, Patient Position: Sitting, Cuff Size: Adult Regular)    "Pulse 64   Resp 18   Ht 1.676 m (5' 6\")   Wt 106.1 kg (233 lb 12.8 oz)   LMP 10/11/2022   SpO2 97%   BMI 37.74 kg/m      Physical Exam    GENERAL APPEARANCE: healthy, alert and no distress     EYES: EOMI, PERRL     HENT: ear canals and TM's normal and nose and mouth without ulcers or lesions     NECK: no adenopathy, no asymmetry, masses, or scars and thyroid normal to palpation     RESP: lungs clear to auscultation - no rales, rhonchi or wheezes     CV: regular rates and rhythm, normal S1 S2, no S3 or S4 and no murmur, click or rub     ABDOMEN:  soft, nontender, no HSM or masses and bowel sounds normal     MS: extremities normal- no gross deformities noted, no evidence of inflammation in joints, FROM in all extremities.     SKIN: no suspicious lesions or rashes     NEURO: Normal strength and tone, sensory exam grossly normal, mentation intact and speech normal     PSYCH: mentation appears normal. and affect normal/bright     LYMPHATICS: No cervical adenopathy    Recent Labs   Lab Test 10/14/22  1332 04/18/22  1225 01/18/21  1500   HGB 11.7 13.4  --      --   --    NA  --  141 139   POTASSIUM  --  4.9 4.5   CR  --  0.81 0.81        Diagnostics:  Labs pending at this time.  Results will be reviewed when available.   No EKG required, no history of coronary heart disease, significant arrhythmia, peripheral arterial disease or other structural heart disease.    Revised Cardiac Risk Index (RCRI):  The patient has the following serious cardiovascular risks for perioperative complications:   - No serious cardiac risks = 0 points     RCRI Interpretation: 0 points: Class I (very low risk - 0.4% complication rate)           Signed Electronically by: TRAVIS Cueto CNP  Copy of this evaluation report is provided to requesting physician.            "

## 2022-10-14 NOTE — PROGRESS NOTES
[facility]  [address]  [place] [address]  Phone: [phone]  Fax: [phone]  Primary Provider: Teresa Ortiz  Pre-op Performing Provider: TERESA ORTIZ      PREOPERATIVE EVALUATION:  Today's date: 10/14/2022    Rosa Maria Mehta is a 41 year old female who presents for a preoperative evaluation.    Surgical Information:  Surgery/Procedure: microdiscectomy   Surgery Location: [facility]   Surgeon: Dr. Mccoy   Surgery Date: 10/21/22  Time of Surgery: 10:00 am  Where patient plans to recover: At home with family  Fax number for surgical facility:     Type of Anesthesia Anticipated: to be determined    Assessment & Plan     The proposed surgical procedure is considered INTERMEDIATE risk.    Pre-op exam  Pre-op exam performed. There are no contraindications for surgery.  She is to avoid taking NSAID's for 7 days prior to surgery.    - HCG qualitative urine; Future    Lumbar radiculopathy  Recommend OTC Tylenol as needed for pain.  She continues Gabapentin.      Benign essential hypertension  This is controlled.  She will hold lisinopril-hydrochlorothiazide the morning of surgery.   - lisinopril-hydrochlorothiazide (ZESTORETIC) 20-25 MG tablet; Take 1 tablet by mouth daily    Anxiety  Mild episode of recurrent major depressive disorder (H)  She continues Paroxetine, Hydroxyzine, and Bupropion.     Mixed hyperlipidemia  She is not currently taking medication.     Morbid obesity (H)  Recommend consuming a healthy diet and exercising. This is contributing to hypertension.         RECOMMENDATION:  APPROVAL GIVEN to proceed with proposed procedure, without further diagnostic evaluation.        Subjective     HPI related to upcoming procedure: Patient has chronic lumbar pain.  She developed constant pain within her left lower extremity in January.  She states that twisting motions, sitting, and bending over exacerbates the pain.  She feels as though nothing assists the pain.  She  has been taking gabapentin. Lumbar xray showed no significant instability. MRI of her lumbar spine shows a large cranially directed disc herniation at lumbar 5-sacral 1 which causes severe canal stenosis with R>L lateral recess narrowing and compression of bilaterally S1 nerve roots. The extrusion is more to the left but the broad based disc in central/right paracentral.     Patient has hypertension and has been taking lisinopril-hydrochlorothiazide 20-25 mg daily.  She has depression and anxiety which is controlled with paroxetine, bupropion, and hydroxyzine.    Answers for HPI/ROS submitted by the patient on 10/14/2022  If you checked off any problems, how difficult have these problems made it for you to do your work, take care of things at home, or get along with other people?: Not difficult at all  PHQ9 TOTAL SCORE: 1      Preop Questions 10/14/2022   1. Have you ever had a heart attack or stroke? No   2. Have you ever had surgery on your heart or blood vessels, such as a stent placement, a coronary artery bypass, or surgery on an artery in your head, neck, heart, or legs? No   3. Do you have chest pain with activity? No   4. Do you have a history of  heart failure? No   5. Do you currently have a cold, bronchitis or symptoms of other infection? No   6. Do you have a cough, shortness of breath, or wheezing? No   7. Do you or anyone in your family have previous history of blood clots? No   8. Do you or does anyone in your family have a serious bleeding problem such as prolonged bleeding following surgeries or cuts? No   9. Have you ever had problems with anemia or been told to take iron pills? No   10. Have you had any abnormal blood loss such as black, tarry or bloody stools, or abnormal vaginal bleeding? No   11. Have you ever had a blood transfusion? No   12. Are you willing to have a blood transfusion if it is medically needed before, during, or after your surgery? Yes   13. Have you or any of your relatives  ever had problems with anesthesia? No   14. Do you have sleep apnea, excessive snoring or daytime drowsiness? No   15. Do you have any artifical heart valves or other implanted medical devices like a pacemaker, defibrillator, or continuous glucose monitor? No   16. Do you have artificial joints? No   17. Are you allergic to latex? No   18. Is there any chance that you may be pregnant? No       Health Care Directive:  Patient does not have a Health Care Directive or Living Will: Discussed advance care planning with patient; information given to patient to review.    Preoperative Review of :   reviewed - controlled substances reflected in medication list.      Status of Chronic Conditions:  See problem list for active medical problems.  Problems all longstanding and stable, except as noted/documented.  See ROS for pertinent symptoms related to these conditions.      Review of Systems  Constitutional, neuro, ENT, endocrine, pulmonary, cardiac, gastrointestinal, genitourinary, musculoskeletal, integument and psychiatric systems are negative, except as otherwise noted.    Patient Active Problem List    Diagnosis Date Noted     History of loop electrical excision procedure (LEEP) 04/25/2022     Priority: Medium     Hx of abnormal pap smear: yes, at age 19 colposcopy, LEEP performed   04/19/11 NIL Pap  10/19/12 NIL Pap, Neg HR HPV   04/18/22 NIL Pap, Neg HR HPV Plan cotest in 1 year due to hx of LEEP.          Anxiety 10/08/2020     Priority: Medium     Obesity (BMI 35.0-39.9) with comorbidity (H) 10/08/2020     Priority: Medium     Major Depression, Recurrent      Priority: Medium     Created by Conversion  Replacement Utility updated for latest IMO load         Vitamin D Deficiency      Priority: Medium     Created by Conversion  Replacement Utility updated for latest IMO load         Easy bruising 10/27/2014     Priority: Medium     Pain During Urination (Dysuria)      Priority: Medium     Created by  Conversion         Depression      Priority: Medium     Created by Conversion         Onychomycosis      Priority: Medium     Created by Conversion         Obesity      Priority: Medium     Created by Conversion         Fatigue      Priority: Medium     Created by Conversion         Hyperlipidemia      Priority: Medium     Created by Conversion          Past Medical History:   Diagnosis Date     Hypertension      Past Surgical History:   Procedure Laterality Date     C/SECTION, CLASSICAL       COLONOSCOPY N/A 7/12/2022    Procedure: COLONOSCOPY, WITH POLYPECTOMY AND BIOPSY;  Surgeon: Brenton Quiroga MD;  Location:  GI     LEEP TX, CERVICAL      at the age of 19     TEAR DUCT SURGERY       Current Outpatient Medications   Medication Sig Dispense Refill     buPROPion (WELLBUTRIN XL) 150 MG 24 hr tablet [BUPROPION (WELLBUTRIN XL) 150 MG 24 HR TABLET] TAKE 1 TABLET BY MOUTH EVERY DAY 90 tablet 3     gabapentin (NEURONTIN) 300 MG capsule TAKE 1 CAPSULE BY MOUTH EVERYDAY AT BEDTIME 30 capsule 0     hydrOXYzine (ATARAX) 25 MG tablet [HYDROXYZINE (ATARAX) 25 MG TABLET] Take 1 tablet (25 mg total) by mouth daily as needed. 30 tablet 2     lisinopril-hydrochlorothiazide (ZESTORETIC) 10-12.5 MG tablet Take 1 tablet by mouth daily 90 tablet 2     PARoxetine (PAXIL) 30 MG tablet TAKE 2 TABS BY MOUTH ONCE DAILY IN THE MORNING 180 tablet 3       No Known Allergies     Social History     Tobacco Use     Smoking status: Every Day     Packs/day: 0.50     Types: Cigarettes     Smokeless tobacco: Never   Substance Use Topics     Alcohol use: No     Family History   Problem Relation Age of Onset     Lung Cancer Mother      Thyroid Disease Mother      Throat cancer Father      Alcoholism Father      Hypertension Maternal Grandmother      Prostate Cancer Maternal Grandfather      History   Drug Use No         Objective     /70 (BP Location: Left arm, Patient Position: Sitting, Cuff Size: Adult Regular)   Pulse 64   Resp 18   Ht  "1.676 m (5' 6\")   Wt 106.1 kg (233 lb 12.8 oz)   LMP 10/11/2022   SpO2 97%   BMI 37.74 kg/m      Physical Exam    GENERAL APPEARANCE: healthy, alert and no distress     EYES: EOMI, PERRL     HENT: ear canals and TM's normal and nose and mouth without ulcers or lesions     NECK: no adenopathy, no asymmetry, masses, or scars and thyroid normal to palpation     RESP: lungs clear to auscultation - no rales, rhonchi or wheezes     CV: regular rates and rhythm, normal S1 S2, no S3 or S4 and no murmur, click or rub     ABDOMEN:  soft, nontender, no HSM or masses and bowel sounds normal     MS: extremities normal- no gross deformities noted, no evidence of inflammation in joints, FROM in all extremities.     SKIN: no suspicious lesions or rashes     NEURO: Normal strength and tone, sensory exam grossly normal, mentation intact and speech normal     PSYCH: mentation appears normal. and affect normal/bright     LYMPHATICS: No cervical adenopathy    Recent Labs   Lab Test 10/14/22  1332 04/18/22  1225 01/18/21  1500   HGB 11.7 13.4  --      --   --    NA  --  141 139   POTASSIUM  --  4.9 4.5   CR  --  0.81 0.81        Diagnostics:  Labs pending at this time.  Results will be reviewed when available.   No EKG required, no history of coronary heart disease, significant arrhythmia, peripheral arterial disease or other structural heart disease.    Revised Cardiac Risk Index (RCRI):  The patient has the following serious cardiovascular risks for perioperative complications:   - No serious cardiac risks = 0 points     RCRI Interpretation: 0 points: Class I (very low risk - 0.4% complication rate)           Signed Electronically by: TRAVIS Cueto CNP  Copy of this evaluation report is provided to requesting physician.            "

## 2022-10-20 ENCOUNTER — TELEPHONE (OUTPATIENT)
Dept: NEUROSURGERY | Facility: CLINIC | Age: 42
End: 2022-10-20

## 2022-10-20 ENCOUNTER — ANESTHESIA EVENT (OUTPATIENT)
Dept: SURGERY | Facility: CLINIC | Age: 42
End: 2022-10-20
Payer: COMMERCIAL

## 2022-10-20 NOTE — TELEPHONE ENCOUNTER
Called patient, discussed surgery, post-op course, expectations, follow up plan.    Reviewed H&P from - 10/14/22 cleared for surgery.   Labs - WNL       MRI done on 7/18/22  - in Nil    To OR as planned. Check in - 10:00    Nothing to eat or drink after midnight the night before surgery.     Bring all pertinent films to hospital the day of surgery.     Continue to refrain from NSAIDS (Ibuprofen, Aleve, Naprosyn), ASA, Over the counter herbal medications or supplements, anti-coagulants and blood thinners.     Shower Instructions: using a washcloth and a bottle of provided Hibiclens, wash your body, avoiding your face, hair, and genitals. Preferably, shower the night before surgery and the morning of surgery using a half a bottle each time for your whole body shower.    Patient confirmed they have help/assistance in place at home upon discharge.    Patient was informed that we will provide up to 1 week prescription of pain medication for post-operative pain.     Instructed patient about the following: After your surgery, if you will be staying in-patient, a nursing provider team will be monitoring you closely throughout your stay and communicate your health status to your surgeon and other providers.  You will be seen by Advanced Practice Providers (e.g., nurse practitioners, clinic nurse specialist, and physician assistants) who will check on you regularly to assess the status of your surgery.   All of pt's questions were answered to her satisfaction. She was informed that we will call after discharge to schedule all necessary post-op follow up appointments.     Nkechi Constantino RN

## 2022-10-21 ENCOUNTER — APPOINTMENT (OUTPATIENT)
Dept: RADIOLOGY | Facility: CLINIC | Age: 42
End: 2022-10-21
Attending: PHYSICIAN ASSISTANT
Payer: COMMERCIAL

## 2022-10-21 ENCOUNTER — HOSPITAL ENCOUNTER (OUTPATIENT)
Facility: CLINIC | Age: 42
Discharge: HOME | End: 2022-10-21
Attending: SURGERY | Admitting: SURGERY
Payer: COMMERCIAL

## 2022-10-21 ENCOUNTER — ANESTHESIA (OUTPATIENT)
Dept: SURGERY | Facility: CLINIC | Age: 42
End: 2022-10-21
Payer: COMMERCIAL

## 2022-10-21 VITALS
HEIGHT: 66 IN | TEMPERATURE: 97.4 F | WEIGHT: 233.8 LBS | BODY MASS INDEX: 37.57 KG/M2 | OXYGEN SATURATION: 93 % | RESPIRATION RATE: 1 BRPM | HEART RATE: 89 BPM | SYSTOLIC BLOOD PRESSURE: 108 MMHG | DIASTOLIC BLOOD PRESSURE: 67 MMHG

## 2022-10-21 DIAGNOSIS — M54.16 LUMBAR RADICULOPATHY: Primary | ICD-10-CM

## 2022-10-21 PROCEDURE — 250N000013 HC RX MED GY IP 250 OP 250 PS 637: Performed by: PHYSICIAN ASSISTANT

## 2022-10-21 PROCEDURE — 250N000013 HC RX MED GY IP 250 OP 250 PS 637: Performed by: ANESTHESIOLOGY

## 2022-10-21 PROCEDURE — 63030 LAMOT DCMPRN NRV RT 1 LMBR: CPT | Mod: LT | Performed by: SURGERY

## 2022-10-21 PROCEDURE — 250N000011 HC RX IP 250 OP 636: Performed by: ANESTHESIOLOGY

## 2022-10-21 PROCEDURE — 250N000009 HC RX 250: Performed by: NURSE ANESTHETIST, CERTIFIED REGISTERED

## 2022-10-21 PROCEDURE — 710N000012 HC RECOVERY PHASE 2, PER MINUTE: Performed by: SURGERY

## 2022-10-21 PROCEDURE — 250N000011 HC RX IP 250 OP 636: Performed by: NURSE ANESTHETIST, CERTIFIED REGISTERED

## 2022-10-21 PROCEDURE — 2894A PR VOIDCORRECT: CPT | Mod: 59 | Performed by: SURGERY

## 2022-10-21 PROCEDURE — 360N000077 HC SURGERY LEVEL 4, PER MIN: Performed by: SURGERY

## 2022-10-21 PROCEDURE — 370N000017 HC ANESTHESIA TECHNICAL FEE, PER MIN: Performed by: SURGERY

## 2022-10-21 PROCEDURE — 250N000011 HC RX IP 250 OP 636: Performed by: PHYSICIAN ASSISTANT

## 2022-10-21 PROCEDURE — 63030 LAMOT DCMPRN NRV RT 1 LMBR: CPT | Mod: AS | Performed by: PHYSICIAN ASSISTANT

## 2022-10-21 PROCEDURE — 250N000009 HC RX 250: Performed by: PHYSICIAN ASSISTANT

## 2022-10-21 PROCEDURE — 250N000009 HC RX 250: Performed by: ANESTHESIOLOGY

## 2022-10-21 PROCEDURE — 258N000003 HC RX IP 258 OP 636: Performed by: ANESTHESIOLOGY

## 2022-10-21 PROCEDURE — 258N000003 HC RX IP 258 OP 636: Performed by: NURSE ANESTHETIST, CERTIFIED REGISTERED

## 2022-10-21 PROCEDURE — 999N000063 XR CROSSTABLE LATERAL LUMBAR SPINE PORTABLE

## 2022-10-21 PROCEDURE — 999N000141 HC STATISTIC PRE-PROCEDURE NURSING ASSESSMENT: Performed by: SURGERY

## 2022-10-21 PROCEDURE — 250N000009 HC RX 250: Performed by: SURGERY

## 2022-10-21 PROCEDURE — 272N000001 HC OR GENERAL SUPPLY STERILE: Performed by: SURGERY

## 2022-10-21 PROCEDURE — 250N000025 HC SEVOFLURANE, PER MIN: Performed by: SURGERY

## 2022-10-21 PROCEDURE — 250N000005 HC OR RX SURGIFLO HEMOSTATIC MATRIX 10ML 199102S OPNP: Performed by: SURGERY

## 2022-10-21 PROCEDURE — 710N000010 HC RECOVERY PHASE 1, LEVEL 2, PER MIN: Performed by: SURGERY

## 2022-10-21 RX ORDER — METHYLPREDNISOLONE 4 MG/1
8 TABLET ORAL AT BEDTIME
Status: DISCONTINUED | OUTPATIENT
Start: 2022-10-21 | End: 2022-10-21 | Stop reason: HOSPADM

## 2022-10-21 RX ORDER — ESMOLOL HYDROCHLORIDE 10 MG/ML
INJECTION INTRAVENOUS PRN
Status: DISCONTINUED | OUTPATIENT
Start: 2022-10-21 | End: 2022-10-21

## 2022-10-21 RX ORDER — OXYCODONE HYDROCHLORIDE 5 MG/1
5 TABLET ORAL EVERY 4 HOURS PRN
Status: DISCONTINUED | OUTPATIENT
Start: 2022-10-21 | End: 2022-10-21 | Stop reason: HOSPADM

## 2022-10-21 RX ORDER — DEXAMETHASONE SODIUM PHOSPHATE 10 MG/ML
10 INJECTION, SOLUTION INTRAMUSCULAR; INTRAVENOUS ONCE
Status: COMPLETED | OUTPATIENT
Start: 2022-10-21 | End: 2022-10-21

## 2022-10-21 RX ORDER — BUPIVACAINE HYDROCHLORIDE AND EPINEPHRINE 5; 5 MG/ML; UG/ML
INJECTION, SOLUTION PERINEURAL PRN
Status: DISCONTINUED | OUTPATIENT
Start: 2022-10-21 | End: 2022-10-21 | Stop reason: HOSPADM

## 2022-10-21 RX ORDER — ONDANSETRON 4 MG/1
4 TABLET, ORALLY DISINTEGRATING ORAL EVERY 6 HOURS PRN
Status: DISCONTINUED | OUTPATIENT
Start: 2022-10-21 | End: 2022-10-21 | Stop reason: HOSPADM

## 2022-10-21 RX ORDER — METHOCARBAMOL 500 MG/1
500 TABLET, FILM COATED ORAL EVERY 6 HOURS PRN
Qty: 42 TABLET | Refills: 1 | Status: SHIPPED | OUTPATIENT
Start: 2022-10-21 | End: 2022-11-08

## 2022-10-21 RX ORDER — METHYLPREDNISOLONE 4 MG
TABLET, DOSE PACK ORAL
Qty: 21 TABLET | Refills: 0 | Status: SHIPPED | OUTPATIENT
Start: 2022-10-21 | End: 2023-09-28

## 2022-10-21 RX ORDER — PROPOFOL 10 MG/ML
INJECTION, EMULSION INTRAVENOUS PRN
Status: DISCONTINUED | OUTPATIENT
Start: 2022-10-21 | End: 2022-10-21

## 2022-10-21 RX ORDER — SODIUM CHLORIDE, SODIUM LACTATE, POTASSIUM CHLORIDE, CALCIUM CHLORIDE 600; 310; 30; 20 MG/100ML; MG/100ML; MG/100ML; MG/100ML
INJECTION, SOLUTION INTRAVENOUS CONTINUOUS
Status: DISCONTINUED | OUTPATIENT
Start: 2022-10-21 | End: 2022-10-21 | Stop reason: HOSPADM

## 2022-10-21 RX ORDER — OXYCODONE HYDROCHLORIDE 5 MG/1
5 TABLET ORAL EVERY 4 HOURS PRN
Qty: 42 TABLET | Refills: 0 | Status: SHIPPED | OUTPATIENT
Start: 2022-10-21 | End: 2022-10-31

## 2022-10-21 RX ORDER — ACETAMINOPHEN 325 MG/1
650 TABLET ORAL EVERY 4 HOURS PRN
Status: DISCONTINUED | OUTPATIENT
Start: 2022-10-24 | End: 2022-10-21 | Stop reason: HOSPADM

## 2022-10-21 RX ORDER — METHYLPREDNISOLONE 4 MG/1
4 TABLET ORAL AT BEDTIME
Status: DISCONTINUED | OUTPATIENT
Start: 2022-10-23 | End: 2022-10-21 | Stop reason: HOSPADM

## 2022-10-21 RX ORDER — ONDANSETRON 4 MG/1
4 TABLET, ORALLY DISINTEGRATING ORAL EVERY 30 MIN PRN
Status: DISCONTINUED | OUTPATIENT
Start: 2022-10-21 | End: 2022-10-21 | Stop reason: HOSPADM

## 2022-10-21 RX ORDER — FENTANYL CITRATE 50 UG/ML
INJECTION, SOLUTION INTRAMUSCULAR; INTRAVENOUS PRN
Status: DISCONTINUED | OUTPATIENT
Start: 2022-10-21 | End: 2022-10-21

## 2022-10-21 RX ORDER — ONDANSETRON 2 MG/ML
4 INJECTION INTRAMUSCULAR; INTRAVENOUS EVERY 6 HOURS PRN
Status: DISCONTINUED | OUTPATIENT
Start: 2022-10-21 | End: 2022-10-21 | Stop reason: HOSPADM

## 2022-10-21 RX ORDER — LIDOCAINE 40 MG/G
CREAM TOPICAL
Status: DISCONTINUED | OUTPATIENT
Start: 2022-10-21 | End: 2022-10-21 | Stop reason: HOSPADM

## 2022-10-21 RX ORDER — ACETAMINOPHEN 325 MG/1
975 TABLET ORAL ONCE
Status: COMPLETED | OUTPATIENT
Start: 2022-10-21 | End: 2022-10-21

## 2022-10-21 RX ORDER — METHYLPREDNISOLONE 4 MG/1
4 TABLET ORAL ONCE
Status: DISCONTINUED | OUTPATIENT
Start: 2022-10-21 | End: 2022-10-21 | Stop reason: HOSPADM

## 2022-10-21 RX ORDER — DEXAMETHASONE SODIUM PHOSPHATE 10 MG/ML
10 INJECTION, SOLUTION INTRAMUSCULAR; INTRAVENOUS ONCE
Status: DISCONTINUED | OUTPATIENT
Start: 2022-10-21 | End: 2022-10-21 | Stop reason: HOSPADM

## 2022-10-21 RX ORDER — FENTANYL CITRATE 50 UG/ML
25 INJECTION, SOLUTION INTRAMUSCULAR; INTRAVENOUS
Status: DISCONTINUED | OUTPATIENT
Start: 2022-10-21 | End: 2022-10-21 | Stop reason: HOSPADM

## 2022-10-21 RX ORDER — METHYLPREDNISOLONE 4 MG/1
4 TABLET ORAL
Status: DISCONTINUED | OUTPATIENT
Start: 2022-10-22 | End: 2022-10-21 | Stop reason: HOSPADM

## 2022-10-21 RX ORDER — FENTANYL CITRATE 50 UG/ML
25 INJECTION, SOLUTION INTRAMUSCULAR; INTRAVENOUS EVERY 5 MIN PRN
Status: DISCONTINUED | OUTPATIENT
Start: 2022-10-21 | End: 2022-10-21 | Stop reason: HOSPADM

## 2022-10-21 RX ORDER — SCOLOPAMINE TRANSDERMAL SYSTEM 1 MG/1
1 PATCH, EXTENDED RELEASE TRANSDERMAL
Status: DISCONTINUED | OUTPATIENT
Start: 2022-10-21 | End: 2022-10-21 | Stop reason: HOSPADM

## 2022-10-21 RX ORDER — FAMOTIDINE 20 MG/1
20 TABLET, FILM COATED ORAL ONCE
Status: COMPLETED | OUTPATIENT
Start: 2022-10-21 | End: 2022-10-21

## 2022-10-21 RX ORDER — METHYLPREDNISOLONE 4 MG/1
8 TABLET ORAL ONCE
Status: DISCONTINUED | OUTPATIENT
Start: 2022-10-21 | End: 2022-10-21 | Stop reason: HOSPADM

## 2022-10-21 RX ORDER — PROCHLORPERAZINE MALEATE 10 MG
10 TABLET ORAL EVERY 6 HOURS PRN
Status: DISCONTINUED | OUTPATIENT
Start: 2022-10-21 | End: 2022-10-21 | Stop reason: HOSPADM

## 2022-10-21 RX ORDER — HYDROMORPHONE HCL IN WATER/PF 6 MG/30 ML
0.2 PATIENT CONTROLLED ANALGESIA SYRINGE INTRAVENOUS
Status: DISCONTINUED | OUTPATIENT
Start: 2022-10-21 | End: 2022-10-21 | Stop reason: HOSPADM

## 2022-10-21 RX ORDER — ONDANSETRON 2 MG/ML
4 INJECTION INTRAMUSCULAR; INTRAVENOUS EVERY 30 MIN PRN
Status: DISCONTINUED | OUTPATIENT
Start: 2022-10-21 | End: 2022-10-21 | Stop reason: HOSPADM

## 2022-10-21 RX ORDER — OXYCODONE HYDROCHLORIDE 5 MG/1
10 TABLET ORAL EVERY 4 HOURS PRN
Status: DISCONTINUED | OUTPATIENT
Start: 2022-10-21 | End: 2022-10-21 | Stop reason: HOSPADM

## 2022-10-21 RX ORDER — LIDOCAINE HYDROCHLORIDE 10 MG/ML
INJECTION, SOLUTION INFILTRATION; PERINEURAL PRN
Status: DISCONTINUED | OUTPATIENT
Start: 2022-10-21 | End: 2022-10-21

## 2022-10-21 RX ORDER — HYDROMORPHONE HCL IN WATER/PF 6 MG/30 ML
0.4 PATIENT CONTROLLED ANALGESIA SYRINGE INTRAVENOUS
Status: DISCONTINUED | OUTPATIENT
Start: 2022-10-21 | End: 2022-10-21 | Stop reason: HOSPADM

## 2022-10-21 RX ORDER — CEFAZOLIN SODIUM/WATER 2 G/20 ML
2 SYRINGE (ML) INTRAVENOUS
Status: COMPLETED | OUTPATIENT
Start: 2022-10-21 | End: 2022-10-21

## 2022-10-21 RX ORDER — PROPOFOL 10 MG/ML
INJECTION, EMULSION INTRAVENOUS CONTINUOUS PRN
Status: DISCONTINUED | OUTPATIENT
Start: 2022-10-21 | End: 2022-10-21

## 2022-10-21 RX ORDER — CEFAZOLIN SODIUM/WATER 2 G/20 ML
2 SYRINGE (ML) INTRAVENOUS SEE ADMIN INSTRUCTIONS
Status: DISCONTINUED | OUTPATIENT
Start: 2022-10-21 | End: 2022-10-21 | Stop reason: HOSPADM

## 2022-10-21 RX ORDER — DEXAMETHASONE SODIUM PHOSPHATE 10 MG/ML
INJECTION, SOLUTION INTRAMUSCULAR; INTRAVENOUS PRN
Status: DISCONTINUED | OUTPATIENT
Start: 2022-10-21 | End: 2022-10-21

## 2022-10-21 RX ORDER — ACETAMINOPHEN 325 MG/1
975 TABLET ORAL EVERY 8 HOURS
Status: DISCONTINUED | OUTPATIENT
Start: 2022-10-21 | End: 2022-10-21 | Stop reason: HOSPADM

## 2022-10-21 RX ORDER — MEPERIDINE HYDROCHLORIDE 50 MG/ML
12.5 INJECTION INTRAMUSCULAR; INTRAVENOUS; SUBCUTANEOUS
Status: DISCONTINUED | OUTPATIENT
Start: 2022-10-21 | End: 2022-10-21 | Stop reason: HOSPADM

## 2022-10-21 RX ORDER — ONDANSETRON 2 MG/ML
INJECTION INTRAMUSCULAR; INTRAVENOUS PRN
Status: DISCONTINUED | OUTPATIENT
Start: 2022-10-21 | End: 2022-10-21

## 2022-10-21 RX ORDER — METHOCARBAMOL 100 MG/ML
1000 INJECTION, SOLUTION INTRAMUSCULAR; INTRAVENOUS ONCE
Status: COMPLETED | OUTPATIENT
Start: 2022-10-21 | End: 2022-10-21

## 2022-10-21 RX ORDER — HYDROMORPHONE HCL IN WATER/PF 6 MG/30 ML
0.2 PATIENT CONTROLLED ANALGESIA SYRINGE INTRAVENOUS EVERY 5 MIN PRN
Status: DISCONTINUED | OUTPATIENT
Start: 2022-10-21 | End: 2022-10-21 | Stop reason: HOSPADM

## 2022-10-21 RX ADMIN — FAMOTIDINE 20 MG: 20 TABLET ORAL at 11:22

## 2022-10-21 RX ADMIN — SCOPALAMINE 1 PATCH: 1 PATCH, EXTENDED RELEASE TRANSDERMAL at 11:40

## 2022-10-21 RX ADMIN — PHENYLEPHRINE HYDROCHLORIDE 0.2 MCG/KG/MIN: 10 INJECTION INTRAVENOUS at 12:54

## 2022-10-21 RX ADMIN — SODIUM CHLORIDE, POTASSIUM CHLORIDE, SODIUM LACTATE AND CALCIUM CHLORIDE: 600; 310; 30; 20 INJECTION, SOLUTION INTRAVENOUS at 13:30

## 2022-10-21 RX ADMIN — SODIUM CHLORIDE, POTASSIUM CHLORIDE, SODIUM LACTATE AND CALCIUM CHLORIDE: 600; 310; 30; 20 INJECTION, SOLUTION INTRAVENOUS at 11:40

## 2022-10-21 RX ADMIN — PHENYLEPHRINE HYDROCHLORIDE 50 MCG: 10 INJECTION INTRAVENOUS at 13:22

## 2022-10-21 RX ADMIN — LIDOCAINE HYDROCHLORIDE 20 MG: 10 INJECTION, SOLUTION INFILTRATION; PERINEURAL at 12:15

## 2022-10-21 RX ADMIN — METHOCARBAMOL 1000 MG: 100 INJECTION INTRAMUSCULAR; INTRAVENOUS at 16:24

## 2022-10-21 RX ADMIN — SUGAMMADEX 200 MG: 100 INJECTION, SOLUTION INTRAVENOUS at 14:45

## 2022-10-21 RX ADMIN — FENTANYL CITRATE 25 MCG: 50 INJECTION, SOLUTION INTRAMUSCULAR; INTRAVENOUS at 15:58

## 2022-10-21 RX ADMIN — HYDROMORPHONE HYDROCHLORIDE 0.5 MG: 1 INJECTION, SOLUTION INTRAMUSCULAR; INTRAVENOUS; SUBCUTANEOUS at 13:50

## 2022-10-21 RX ADMIN — HYDROMORPHONE HYDROCHLORIDE 0.5 MG: 1 INJECTION, SOLUTION INTRAMUSCULAR; INTRAVENOUS; SUBCUTANEOUS at 14:05

## 2022-10-21 RX ADMIN — FENTANYL CITRATE 25 MCG: 50 INJECTION, SOLUTION INTRAMUSCULAR; INTRAVENOUS at 16:13

## 2022-10-21 RX ADMIN — ONDANSETRON 4 MG: 2 INJECTION INTRAMUSCULAR; INTRAVENOUS at 14:30

## 2022-10-21 RX ADMIN — FENTANYL CITRATE 100 MCG: 50 INJECTION, SOLUTION INTRAMUSCULAR; INTRAVENOUS at 12:15

## 2022-10-21 RX ADMIN — Medication 2 G: at 12:15

## 2022-10-21 RX ADMIN — ROCURONIUM BROMIDE 40 MG: 10 INJECTION, SOLUTION INTRAVENOUS at 13:31

## 2022-10-21 RX ADMIN — FENTANYL CITRATE 25 MCG: 50 INJECTION, SOLUTION INTRAMUSCULAR; INTRAVENOUS at 15:13

## 2022-10-21 RX ADMIN — FENTANYL CITRATE 25 MCG: 50 INJECTION, SOLUTION INTRAMUSCULAR; INTRAVENOUS at 15:22

## 2022-10-21 RX ADMIN — PROPOFOL 180 MG: 10 INJECTION, EMULSION INTRAVENOUS at 12:15

## 2022-10-21 RX ADMIN — PROPOFOL 50 MCG/KG/MIN: 10 INJECTION, EMULSION INTRAVENOUS at 12:30

## 2022-10-21 RX ADMIN — MIDAZOLAM 2 MG: 1 INJECTION INTRAMUSCULAR; INTRAVENOUS at 12:11

## 2022-10-21 RX ADMIN — DEXAMETHASONE SODIUM PHOSPHATE 10 MG: 10 INJECTION, SOLUTION INTRAMUSCULAR; INTRAVENOUS at 12:15

## 2022-10-21 RX ADMIN — ESMOLOL HYDROCHLORIDE 30 MG: 10 INJECTION, SOLUTION INTRAVENOUS at 14:51

## 2022-10-21 RX ADMIN — ROCURONIUM BROMIDE 60 MG: 10 INJECTION, SOLUTION INTRAVENOUS at 12:15

## 2022-10-21 RX ADMIN — ACETAMINOPHEN 975 MG: 325 TABLET, FILM COATED ORAL at 11:21

## 2022-10-21 RX ADMIN — OXYCODONE HYDROCHLORIDE 5 MG: 5 TABLET ORAL at 15:52

## 2022-10-21 RX ADMIN — FENTANYL CITRATE 25 MCG: 50 INJECTION, SOLUTION INTRAMUSCULAR; INTRAVENOUS at 15:43

## 2022-10-21 RX ADMIN — DEXAMETHASONE SODIUM PHOSPHATE 10 MG: 10 INJECTION, SOLUTION INTRAMUSCULAR; INTRAVENOUS at 16:25

## 2022-10-21 ASSESSMENT — ENCOUNTER SYMPTOMS: SEIZURES: 0

## 2022-10-21 ASSESSMENT — ACTIVITIES OF DAILY LIVING (ADL)
ADLS_ACUITY_SCORE: 37
ADLS_ACUITY_SCORE: 35
ADLS_ACUITY_SCORE: 37
ADLS_ACUITY_SCORE: 35

## 2022-10-21 NOTE — ANESTHESIA PREPROCEDURE EVALUATION
Anesthesia Pre-Procedure Evaluation    Patient: Rosa Maria Mehta   MRN: [id number] : 1980        Procedure : Procedure(s):  Left possible right hemilaminectomy, medial facetectomy, foraminotomy, microdiscectomies at lumbar 5 - sacral 1          Past Medical History:   Diagnosis Date     Hypertension      Motion sickness       Past Surgical History:   Procedure Laterality Date     C/SECTION, CLASSICAL       COLONOSCOPY N/A 2022    Procedure: COLONOSCOPY, WITH POLYPECTOMY AND BIOPSY;  Surgeon: Brenton Quiroga MD;  Location:  GI     LEEP TX, CERVICAL      at the age of 19     TEAR DUCT SURGERY        No Known Allergies   Social History     Tobacco Use     Smoking status: Every Day     Packs/day: 0.50     Types: Cigarettes     Smokeless tobacco: Never   Substance Use Topics     Alcohol use: No      Wt Readings from Last 1 Encounters:   10/14/22 106.1 kg (233 lb 12.8 oz)        Anesthesia Evaluation            ROS/MED HX  ENT/Pulmonary:  - neg pulmonary ROS  (-) sleep apnea   Neurologic:  - neg neurologic ROS  (-) no seizures and no CVA   Cardiovascular:     (+) hypertension-----    METS/Exercise Tolerance:     Hematologic:  - neg hematologic  ROS     Musculoskeletal:  - neg musculoskeletal ROS     GI/Hepatic:  - neg GI/hepatic ROS     Renal/Genitourinary:  - neg Renal ROS     Endo:     (+) Obesity,     Psychiatric/Substance Use:  - neg psychiatric ROS     Infectious Disease:  - neg infectious disease ROS     Malignancy:       Other:            Physical Exam    Airway  airway exam normal      Mallampati: II   TM distance: > 3 FB   Neck ROM: full   Mouth opening: > 3 cm    Respiratory Devices and Support         Dental  no notable dental history         Cardiovascular   cardiovascular exam normal       Rhythm and rate: regular and normal     Pulmonary   pulmonary exam normal        breath sounds clear to auscultation           OUTSIDE LABS:  CBC:   Lab Results   Component Value Date    WBC 7.8 10/14/2022     HGB 11.7 10/14/2022    HGB 13.4 04/18/2022    HCT 36.0 10/14/2022     10/14/2022     BMP:   Lab Results   Component Value Date     10/14/2022     04/18/2022    POTASSIUM 4.7 10/14/2022    POTASSIUM 4.9 04/18/2022    CHLORIDE 103 10/14/2022    CHLORIDE 102 04/18/2022    CO2 25 10/14/2022    CO2 26 04/18/2022    BUN 14.3 10/14/2022    BUN 9 04/18/2022    CR 0.75 10/14/2022    CR 0.81 04/18/2022    GLC 91 10/14/2022     04/18/2022     COAGS:   Lab Results   Component Value Date    PTT 27 10/14/2022    INR 0.92 10/14/2022     POC:   Lab Results   Component Value Date    HCG Negative 10/14/2022     HEPATIC:   Lab Results   Component Value Date    ALBUMIN 4.1 04/18/2022    PROTTOTAL 7.6 04/18/2022    ALT 13 04/18/2022    AST 16 04/18/2022    ALKPHOS 91 04/18/2022    BILITOTAL 0.3 04/18/2022     OTHER:   Lab Results   Component Value Date    KAREN 10.2 (H) 10/14/2022    TSH 4.41 04/18/2022       Anesthesia Plan    ASA Status:  2      Anesthesia Type: General.     - Airway: ETT              Consents    Anesthesia Plan(s) and associated risks, benefits, and realistic alternatives discussed. Questions answered and patient/representative(s) expressed understanding.    - Discussed:     - Discussed with:  Patient         Postoperative Care    Pain management: Multi-modal analgesia.   PONV prophylaxis: Ondansetron (or other 5HT-3), Dexamethasone or Solumedrol, Background Propofol Infusion     Comments:                Edward Tucker MD

## 2022-10-21 NOTE — ANESTHESIA PROCEDURE NOTES
Airway       Patient location during procedure: OR       Procedure Start/Stop Times: 10/21/2022 12:17 PM and 10/21/2022 12:19 PM  Staff -        CRNA: Rylan Martinez APRN CRNA       Performed By: CRNA  Consent for Airway        Urgency: elective  Indications and Patient Condition       Indications for airway management: palak-procedural and airway protection         Mask difficulty assessment: 1 - vent by mask    Final Airway Details       Final airway type: endotracheal airway       Successful airway: ETT - single  Endotracheal Airway Details        ETT size (mm): 7.0       Cuffed: yes       Successful intubation technique: direct laryngoscopy       DL Blade Type: Owens 2       Grade View of Cords: 1       Adjucts: stylet       Position: Right       Measured from: lips       Secured at (cm): 23       Bite block used: Soft    Post intubation assessment        Placement verified by: capnometry, equal breath sounds and chest rise        Number of attempts at approach: 1       Secured with: silk tape       Ease of procedure: easy       Dentition: Intact and Unchanged       Dental guard used and removed. Dental Guard Type: Proguard Red.    Medication(s) Administered   Medication Administration Time: 10/21/2022 12:17 PM

## 2022-10-21 NOTE — ANESTHESIA POSTPROCEDURE EVALUATION
Patient: Rosa Maria Mehta    Procedure: Procedure(s):  Left  hemilaminectomy, medial facetectomy, foraminotomy, microdiscectomies at lumbar 5 - sacral 1       Anesthesia Type:  General    Note:  Disposition: Outpatient   Postop Pain Control: Uneventful            Sign Out: Well controlled pain   PONV: No   Neuro/Psych: Uneventful            Sign Out: Acceptable/Baseline neuro status   Airway/Respiratory: Uneventful            Sign Out: Acceptable/Baseline resp. status   CV/Hemodynamics: Uneventful            Sign Out: Acceptable CV status; No obvious hypovolemia; No obvious fluid overload   Other NRE: NONE   DID A NON-ROUTINE EVENT OCCUR? No           Last vitals:  Vitals Value Taken Time   /66 10/21/22 1520   Temp 36.9  C (98.4  F) 10/21/22 1500   Pulse 96 10/21/22 1527   Resp 21 10/21/22 1527   SpO2 92 % 10/21/22 1527   Vitals shown include unvalidated device data.    Electronically Signed By: Edward Tucker MD  October 21, 2022  3:29 PM   [facility]  Juve Parks M.D.  Favio Flores D.O.  Karmen Taylor M.D.  Elle Barajas, MSN, ANP-C  [phone]    [address] [place] [address] Agree with above, I have seen and examined the patient Plan discussed with patient. Stressed severity of condition and patient refuses any surgical intervention.  Discussed risk of infection spreading up her leg or into her bloodstream which could put limb/life at risk.  will continue to closely monitor and treat as aggressively as the patient will allow 76 y F with multiple comorbidities, including schizophrenia, WCbound, sacral decub, adm for nonhealing heel ulcers, s/p partial R calcanectomy. Remains lethargic. Poor overall prognosis. DNR/DNI. Surrogate in agreement for patient to return to [facility] hosp[facility]. Comfort measures. pt seen and  examined with ID resident

## 2022-10-21 NOTE — INTERVAL H&P NOTE
"I have reviewed the surgical (or preoperative) H&P that is linked to this encounter, and examined the patient. There are no significant changes    Clinical Conditions Present on Arrival:  Clinically Significant Risk Factors Present on Admission            # Hypercalcemia: Highest Ca = 10.2 mg/dL (Ref range: 8.5 - 10.1 mg/dL) in last 30 days, will monitor as appropriate         # Obesity: Estimated body mass index is 37.74 kg/m  as calculated from the following:    Height as of 10/14/22: 5' 6\" (1.676 m).    Weight as of 10/14/22: 233 lb 12.8 oz (106.1 kg).       "

## 2022-10-21 NOTE — BRIEF OP NOTE
[facility] Brief Operative Note    Pre-operative diagnosis: Lumbar radiculopathy [M54.16]  Lumbar disc herniation [M51.26]   Post-operative diagnosis same   Procedure: Procedure(s):  Left hemilaminectomy, medial facetectomy, foraminotomy, microdiscectomies at lumbar 5 - sacral 1   Surgeon(s): Surgeon(s) and Role:     * Tanja Mccoy MD - Primary     * Madison Lunsford PA-C - Assisting   Estimated blood loss: 25 mL    Specimens: * No specimens in log *   Findings: Large calcified disc herniation

## 2022-10-21 NOTE — PHARMACY-ADMISSION MEDICATION HISTORY
Pharmacy Note - Admission Medication History    Pertinent Provider Information:     Patient hasn't been taking bupropion but said she should be.   ______________________________________________________________________    Prior To Admission (PTA) med list completed and updated in EMR.       PTA Med List   Medication Sig Last Dose     buPROPion (WELLBUTRIN XL) 150 MG 24 hr tablet [BUPROPION (WELLBUTRIN XL) 150 MG 24 HR TABLET] TAKE 1 TABLET BY MOUTH EVERY DAY More than a month     gabapentin (NEURONTIN) 300 MG capsule TAKE 1 CAPSULE BY MOUTH EVERYDAY AT BEDTIME      hydrOXYzine (ATARAX) 25 MG tablet [HYDROXYZINE (ATARAX) 25 MG TABLET] Take 1 tablet (25 mg total) by mouth daily as needed.      lisinopril-hydrochlorothiazide (ZESTORETIC) 20-25 MG tablet Take 1 tablet by mouth daily 10/20/2022 at [facility]     PARoxetine (PAXIL) 30 MG tablet TAKE 2 TABS BY MOUTH ONCE DAILY IN THE MORNING 10/20/2022 at [facility]       Information source(s): [facility]Everywhere[facility]    Method of interview communication: in-person    Patient was asked about OTC/herbal products specifically.  PTA med list reflects this.    Based on the pharmacist's assessment, the PTA med list information appears reliable    Allergies were reviewed, assessed, and updated with the patient.      Patient does not use any multi-dose medications prior to admission.     Thank you for the opportunity to participate in the care of this patient.      Brandi Sommer RPH     10/21/2022     11:24 AM

## 2022-10-21 NOTE — ANESTHESIA CARE TRANSFER NOTE
Patient: Rosa Maria Mehta    Procedure: Procedure(s):  Left  hemilaminectomy, medial facetectomy, foraminotomy, microdiscectomies at lumbar 5 - sacral 1       Diagnosis: Lumbar radiculopathy [M54.16]  Lumbar disc herniation [M51.26]  Diagnosis Additional Information: No value filed.    Anesthesia Type:   General     Note:    Oropharynx: oropharynx clear of all foreign objects  Level of Consciousness: awake  Oxygen Supplementation: face mask  Level of Supplemental Oxygen (L/min / FiO2): 6  Independent Airway: airway patency satisfactory and stable  Dentition: dentition unchanged  Vital Signs Stable: post-procedure vital signs reviewed and stable  Report to RN Given: handoff report given  Patient transferred to: PACU    Handoff Report: Identifed the Patient, Identified the Reponsible Provider, Reviewed the pertinent medical history, Discussed the surgical course, Reviewed Intra-OP anesthesia mangement and issues during anesthesia, Set expectations for post-procedure period and Allowed opportunity for questions and acknowledgement of understanding      Vitals:  Vitals Value Taken Time   /85 10/21/22 1456   Temp 36.9  C (98.4  F) 10/21/22 1455   Pulse 90 10/21/22 1458   Resp 25 10/21/22 1458   SpO2 100 % 10/21/22 1458   Vitals shown include unvalidated device data.    Electronically Signed By: TRAVIS Molina CRNA  October 21, 2022  2:59 PM

## 2022-10-24 ENCOUNTER — TELEPHONE (OUTPATIENT)
Dept: NEUROSURGERY | Facility: CLINIC | Age: 42
End: 2022-10-24

## 2022-10-24 NOTE — TELEPHONE ENCOUNTER
PATIENT NAME:  Rosa Maria Mehta  YOB: 1980  MRN: [id number]  SURGEON: Dr. Mccoy  DATE of SURGERY: 10/21/2022  PROCEDURE: Left hemilaminectomy, medial facetectomy, foraminotomy, microdiscectomies at lumbar 5 - sacral 1    FOLLOW-UP:  Wound Check: 7-10 days  Staples/Sutures Out: n/a  Post Op Visit: 6 weeks  Post-op Provider: MARIA ELENA on Dr. Mccoy clinic day  DIAGNOSTICS: n/a  DISPOSITION: Home same day    ADDITIONAL INSTRUCTIONS FOR MEDICAL STAFF:        Hina Vickers RN, CNRN

## 2022-10-24 NOTE — OP NOTE
NEUROSURGERY OPERATIVE REPORT    DATE OF SERVICE: 10/21/2022    PREOPERATIVE DIAGNOSES:  Lumbar radiculopathy  Lumbar disc herniation    POSTOPERATIVE DIAGNOSES:  same    PROCEDURES:  1.  Left lumbar 5-sacral 1hemilaminectomy, medial facetectomy, foraminotomy and microdiscectomies  2.  Use of operating room microscope.     SURGEON:  Tanja Mccoy MD    ASSISTANT: Madison Lunsford PA-C    INDICATIONS:  Rosa Maria Mehta is a 40 yo female who presents with a left S1 radiculopathy. Diminished S1 reflex on left on exam. Lumbar xray shows no significant instability. MRI of her lumbar spine shows a large cranially directed disc herniation at lumbar 5-sacral 1 which causes severe canal stenosis with R>L lateral recess narrowing and compression of bilaterally S1 nerve roots. The extrusion is more to the left but the broad based disc in central/right paracentral. Recommend left possible right lumbar 5-sacral 1 microdiscectomy. Risks and benefits of lumbar decompression discussed including but not limited to infection, hematoma, recurrent disc herniation, nerve damage including paralysis, post op radiculitis, durotomy, risks associated with the use of general anesthesia, blood clots in the lungs or legs. She agreed to proceed.     PROCEDURE:  After obtaining informed consent, the patient was brought to the operating room with pneumatic stockings in place.  IV antibiotics was administered.  She was intubated under general endotracheal anesthesia.  She was turned into the radiolucent laminectomy frame with all pressure points well padded.  She was prepped and draped in the usual sterile fashion.  A preincisional infiltration of local anesthetic was performed along the midline and the incision was opened sharply and extended down to the fascia.  The fascia was opened in one layer with monopolar electrocautery.  A subperiosteal dissection was undertaken to expose the lamina at left lumbar 5-sacral 1.   We verified levels with  a lateral x-ray.      Once levels were verified, we then proceeded to remove the inferior portion of the lamina of left lumbar 5 with Kerrisons and a Midas drill after bringing in the intraoperative microscope.   We drilled down to the ligamentum elevated the ligamentum from the underlying thecal sac and removed it with a combination of Kerrisons and curettes.  We extended the dissection superiorly and inferiorly until there was no compression by the lamina or ligamentum on the underlying thecal sac. We next drilled a partial medial facetectomy  and foraminotomies opening up the lateral recess and expose the underlying impinged lateral recess and nerve roots. We then exposed the annulus of the disc space protected the nerve and cut the annulus as needed to evacuate the disc bulge to release impingement on the nerve.  We used a combination of pituitaries and curettes to remove sufficient disc loose in the canal and from the interdiscal space. We used several blunt hook under the thecal sac and in the foramen to verify that there was no further disc material to milk out into the dissection site. Of note patient had a very calcified disc herniation that significantly narrowed the canal.  Once the thecal sac and nerve roots were completely decompressed we used again sveral nerve hooks to verify no further pressure either in the canal or in the foramina.  The lateral recess was nicely decompressed. We then proceeded to copiously irrigate the incision with antibiotic-containing saline and achieved hemostasis.    An assistant was needed for positioning, retraction and closure.     The incisions were closed in layers with interrupted 0 Vicryl to approximate the muscle and fascia, inverted 2-0 Vicryl to approximate the subcutaneous tissues and monocryl to approximate the skin edges.  Exofin was then placed. Sponge and needle counts were correct prior to closure x2.  Sterile dressings were placed.      Patient tolerated the  procedure well, was taken to the recovery room where she was extubated and found to be at her neurological baseline with no new deficits appreciated.    Estimated blood loss: 25    Specimens: none    Implants: none    Findings: Large calcified disc herniation       Tanja Mccoy MD    Cc: Teresa Ortiz

## 2022-10-24 NOTE — TELEPHONE ENCOUNTER
10/24/22 LDVM for pt to call back at [phone]; post op appts needed.   Await a call back.  TG

## 2022-10-25 ENCOUNTER — TELEPHONE (OUTPATIENT)
Dept: NEUROSURGERY | Facility: CLINIC | Age: 42
End: 2022-10-25

## 2022-10-25 NOTE — TELEPHONE ENCOUNTER
10/25/22 LDVM for pt to call back at [phone]; post op appts needed.  Await a call back.  TG

## 2022-10-25 NOTE — TELEPHONE ENCOUNTER
10/25/22 LDVM for pt to call back at [phone]; post op appts needed.  Await a call back.  TG

## 2022-10-27 ENCOUNTER — TELEPHONE (OUTPATIENT)
Dept: NEUROSURGERY | Facility: CLINIC | Age: 42
End: 2022-10-27

## 2022-10-27 NOTE — TELEPHONE ENCOUNTER
10/25/22 LDVM for pt to call back at [phone]; post op appts needed.  Await a call back.  TG

## 2022-10-31 ENCOUNTER — MYC MEDICAL ADVICE (OUTPATIENT)
Dept: NEUROSURGERY | Facility: CLINIC | Age: 42
End: 2022-10-31

## 2022-10-31 ENCOUNTER — TELEPHONE (OUTPATIENT)
Dept: NEUROSURGERY | Facility: CLINIC | Age: 42
End: 2022-10-31

## 2022-10-31 DIAGNOSIS — M54.16 LUMBAR RADICULOPATHY: ICD-10-CM

## 2022-10-31 RX ORDER — OXYCODONE HYDROCHLORIDE 5 MG/1
5 TABLET ORAL EVERY 6 HOURS PRN
Qty: 20 TABLET | Refills: 0 | Status: SHIPPED | OUTPATIENT
Start: 2022-10-31 | End: 2023-09-28

## 2022-10-31 NOTE — TELEPHONE ENCOUNTER
Rosa Maria is s/p Left  hemilaminectomy, medial facetectomy, foraminotomy, microdiscectomies at lumbar 5 - sacral 1 on 10/21/22with Dr. Mcocy. She is 10 days post op.     She did not present to clinic today for her wound check. RN called and pt stated she was napping. Proceeded with a post-op telephone assessment.     Rosa Maria presented preoperatively with left S1 radiculopathy. Today, she reports very minimal low back pain, and sharp nerve pain left leg and buttock and thigh with activity, no numbness/tingling.    Taking oxycodone and robaxin as prescribed for pain with moderate relief. Recommend: alternating tylenol and ibuprofen and ice/heat to left leg, she stopped taking gabapentin 300mg before surgery, encouraged her to restart this (discussed titration). Pt verbalized understanding of how to change medication regimen for better symptom management.     She has 2 tablets left of oxycodone and requested a refill. Per : oxycodone last filled on 10/21, 42#, 7 day supply. No discrepancies. Can fill for Q6 hours.      *Pt will upload a photo of her incision to [facility]. She verbalizes no concerns about wound healing; no s/s of infection.     All of patient's questions addressed today. She was instructed to call with any additional questions/concerns.     Nkechi Constantino RN

## 2022-11-08 RX ORDER — METHOCARBAMOL 500 MG/1
500 TABLET, FILM COATED ORAL EVERY 6 HOURS PRN
Qty: 42 TABLET | Refills: 1 | Status: SHIPPED | OUTPATIENT
Start: 2022-11-08

## 2022-11-30 NOTE — PROGRESS NOTES
Neurosurgery Progress Note: 12/1/2022     A/P:   Postoperative left hemilaminectomy, medial facetectomy, foraminotomy, microdiscectomies at lumbar 5 - sacral 1 on October 21, 2022     Plan:  Ms. Mehta is has been advised to wash her incision daily with daily dressing changes. Will plan to see her next week for a wound check. She has also been advised to remain out of work an additional two weeks while beginning therapy. At which time she may return to work December 19, 2022 with the restrictions of no lifting greater than 20 pounds and no prolonged sitting or standing greater than 2 hours without a 15 minute break, should avoid repetative bending and twisting. She has been advised to follow up in 6 weeks for further evaluation with plans to return to work without restrictions at that time. She will gradually increase her weight restrictions by 5 pounds per week until back to normal. She understands that should any of her symptoms return or worsen to contact the office sooner.     Ms. Mehta is a 42 year old right handed female that presents postoperative left hemilaminectomy, medial facetectomy, foraminotomy, microdiscectomies at lumbar 5 - sacral 1 on October 21, 2022 by Dr. Mccoy. She presently states that she is doing okay she states that following surgery she had continued left radicular lower extremity pain for 4 weeks but states that over the past two weeks has noted improvements. She notes continued radicular pain into her posterolateral aspect of her left leg to her knee but states that the further radicular pain has resolved. She states that her pain is improved but still present and describes it as a aching pain.        HPI:  Rosa Maria Mehta is a 42 yo female who presents with a left S1 radiculopathy. Diminished S1 reflex on left on exam. Lumbar xray shows no significant instability. MRI of her lumbar spine shows a large cranially directed disc herniation at lumbar 5-sacral 1 which causes severe canal  "stenosis with R>L lateral recess narrowing and compression of bilaterally S1 nerve roots. The extrusion is more to the left but the broad based disc in central/right paracentral. Recommend left possible right lumbar 5-sacral 1 microdiscectomy. Risks and benefits of lumbar decompression discussed including but not limited to infection, hematoma, recurrent disc herniation, nerve damage including paralysis, post op radiculitis, durotomy, risks associated with the use of general anesthesia, blood clots in the lungs or legs. She agreed to proceed.       Exam:  /87   Pulse 88   Ht 5' 6\" (1.676 m)   Wt 233 lb (105.7 kg)   LMP 10/11/2022   SpO2 98%   BMI 37.61 kg/m      General: alert and oriented x3     Strength is 5/5 throughout both lower extremities throughout.    Sensation is intact throughout both upper and lower extremities    Gait is smooth and coordinated    Incision: healing with scab formation and small amount of surrounding erythema without induration or drainage     Madison Lunsford PA-C  [facility] Neurosurgery  O: [phone]  "

## 2022-12-01 ENCOUNTER — OFFICE VISIT (OUTPATIENT)
Dept: NEUROSURGERY | Facility: CLINIC | Age: 42
End: 2022-12-01
Attending: PHYSICIAN ASSISTANT
Payer: COMMERCIAL

## 2022-12-01 VITALS
HEIGHT: 66 IN | OXYGEN SATURATION: 98 % | BODY MASS INDEX: 37.45 KG/M2 | HEART RATE: 88 BPM | WEIGHT: 233 LBS | DIASTOLIC BLOOD PRESSURE: 87 MMHG | SYSTOLIC BLOOD PRESSURE: 126 MMHG

## 2022-12-01 DIAGNOSIS — M51.26 LUMBAR DISC HERNIATION: Primary | ICD-10-CM

## 2022-12-01 PROCEDURE — 99024 POSTOP FOLLOW-UP VISIT: CPT | Performed by: PHYSICIAN ASSISTANT

## 2022-12-01 NOTE — PATIENT INSTRUCTIONS
Ms. Mehta is has been advised to wash her incision daily with daily dressing changes. Will plan to see her next week for a wound check. She has also been advised to remain out of work an additional two weeks while beginning therapy. At which time she may return to work December 19, 2022 with the restrictions of no lifting greater than 20 pounds and no prolonged sitting or standing greater than 2 hours without a 15 minute break, should avoid repetative bending and twisting. She has been advised to follow up in 6 weeks for further evaluation with plans to return to work without restrictions at that time. She will gradually increase her weight restrictions by 5 pounds per week until back to normal. She understands that should any of her symptoms return or worsen to contact the office sooner.

## 2022-12-01 NOTE — LETTER
12/1/2022         RE: Rosa Maria Mehta  [address]  [facility] [address]        Dear Colleague,    Thank you for referring your patient, Rosa Maria Mehta, to the [facility]. Please see a copy of my visit note below.    Neurosurgery Progress Note: 12/1/2022     A/P:   Postoperative left hemilaminectomy, medial facetectomy, foraminotomy, microdiscectomies at lumbar 5 - sacral 1 on October 21, 2022     Plan:  Ms. Mehta is has been advised to wash her incision daily with daily dressing changes. Will plan to see her next week for a wound check. She has also been advised to remain out of work an additional two weeks while beginning therapy. At which time she may return to work December 19, 2022 with the restrictions of no lifting greater than 20 pounds and no prolonged sitting or standing greater than 2 hours without a 15 minute break, should avoid repetative bending and twisting. She has been advised to follow up in 6 weeks for further evaluation with plans to return to work without restrictions at that time. She will gradually increase her weight restrictions by 5 pounds per week until back to normal. She understands that should any of her symptoms return or worsen to contact the office sooner.     Ms. Mehta is a 42 year old right handed female that presents postoperative left hemilaminectomy, medial facetectomy, foraminotomy, microdiscectomies at lumbar 5 - sacral 1 on October 21, 2022 by Dr. Mccoy. She presently states that she is doing okay she states that following surgery she had continued left radicular lower extremity pain for 4 weeks but states that over the past two weeks has noted improvements. She notes continued radicular pain into her posterolateral aspect of her left leg to her knee but states that the further radicular pain has resolved. She states that her pain is improved but still present and describes it as a aching pain.        HPI:  Rosa Maria Mehta is a 40 yo  "female who presents with a left S1 radiculopathy. Diminished S1 reflex on left on exam. Lumbar xray shows no significant instability. MRI of her lumbar spine shows a large cranially directed disc herniation at lumbar 5-sacral 1 which causes severe canal stenosis with R>L lateral recess narrowing and compression of bilaterally S1 nerve roots. The extrusion is more to the left but the broad based disc in central/right paracentral. Recommend left possible right lumbar 5-sacral 1 microdiscectomy. Risks and benefits of lumbar decompression discussed including but not limited to infection, hematoma, recurrent disc herniation, nerve damage including paralysis, post op radiculitis, durotomy, risks associated with the use of general anesthesia, blood clots in the lungs or legs. She agreed to proceed.       Exam:  /87   Pulse 88   Ht 5' 6\" (1.676 m)   Wt 233 lb (105.7 kg)   LMP 10/11/2022   SpO2 98%   BMI 37.61 kg/m      General: alert and oriented x3     Strength is 5/5 throughout both lower extremities throughout.    Sensation is intact throughout both upper and lower extremities    Gait is smooth and coordinated    Incision: healing with scab formation and small amount of surrounding erythema without induration or drainage     Madison Lunsford PA-C  [facility] Neurosurgery  O: [phone]      Again, thank you for allowing me to participate in the care of your patient.        Sincerely,        Madison Lunsford PA-C    "

## 2022-12-14 ENCOUNTER — MYC MEDICAL ADVICE (OUTPATIENT)
Dept: NEUROSURGERY | Facility: CLINIC | Age: 42
End: 2022-12-14

## 2023-04-05 ENCOUNTER — PATIENT OUTREACH (OUTPATIENT)
Dept: FAMILY MEDICINE | Facility: CLINIC | Age: 43
End: 2023-04-05
Payer: COMMERCIAL

## 2023-04-05 PROBLEM — Z98.890 HISTORY OF LOOP ELECTRICAL EXCISION PROCEDURE (LEEP): Status: ACTIVE | Noted: 2022-04-25

## 2023-04-20 ENCOUNTER — PATIENT OUTREACH (OUTPATIENT)
Dept: CARE COORDINATION | Facility: CLINIC | Age: 43
End: 2023-04-20
Payer: COMMERCIAL

## 2023-05-24 NOTE — TELEPHONE ENCOUNTER
Gilmer Moore,   Patient is lost to pap tracking follow-up. Attempts to contact pt have been made per reminder process and there has been no reply and/or no appt scheduled.  If you are wanting any additional contact attempts please send to your care team staff.     Pap Hx:  Hx of abnormal pap smear: yes, at age 19 colposcopy, LEEP performed   04/19/11 NIL Pap  10/19/12 NIL Pap, Neg HR HPV   04/18/22 NIL Pap, Neg HR HPV Plan cotest in 1 year due to hx of LEEP.   04/5/23 Reminder Sent  04/25/23 Reminder call-lm  05/24/23 Lost to follow-up for pap tracking, roberta routed to provider

## 2023-06-04 ENCOUNTER — HEALTH MAINTENANCE LETTER (OUTPATIENT)
Age: 43
End: 2023-06-04

## 2023-08-03 ENCOUNTER — NURSE TRIAGE (OUTPATIENT)
Dept: FAMILY MEDICINE | Facility: CLINIC | Age: 43
End: 2023-08-03
Payer: COMMERCIAL

## 2023-08-03 DIAGNOSIS — N39.0 LOWER URINARY TRACT INFECTIOUS DISEASE: ICD-10-CM

## 2023-08-03 RX ORDER — NITROFURANTOIN 25; 75 MG/1; MG/1
CAPSULE ORAL
Qty: 30 CAPSULE | Refills: 0 | Status: SHIPPED | OUTPATIENT
Start: 2023-08-03 | End: 2024-07-29

## 2023-08-03 NOTE — TELEPHONE ENCOUNTER
NAME: Todd Phillips    : 1948    DATE : 19    DIAGNOSIS:   1.   Stage III moderately differentiated adenocarcinoma of the head of the pancreas with involvement of the duodenum ([id number]).    2.  Repeated episodes of bacteremia due to cholangitis -    3.  Refractory nausea/vomiting    CHIEF COMPLAINT:  Fatigue, weakness, nausea, vomiting, constipation, abdominal pain    TREATMENT:  1.       2.  On 18, Dr. Brody performed  Pancreaticoduodenectomy, wedge biopsy of the liver, portal vein resection and lateral repair and gastrojejunostomy tube placement    HISTORY OF PRESENT ILLNESS:   Todd Phillips is a very pleasant 70 y.o. male who is being seen today at the request of Dr. Miquel Brody for evaluation and treatment of pancreatic cancer.  Mr. Phillips initially developed symptoms in 2018. At that time he had pain in his upper abdomen which would radiate to his back.  In July, the pain intensified.  He was seen in the ER and also by Dr. Su.  He underwent RUQ U/S and then HIDA scan and it was determined he had a dysfunctional gallbladder.  He saw Dr. Downs and had cholecystectomy on 8-10-18.  Pathology showed chronic cholecystitis and an incidental benign lymph node.  He re-presented in the post-operative period with jaundice.  Dr. Su performed ERCP on18.  He was noted to have a 3 cm irregular tight stricture of the distal common bile duct with proximal biliary ductal dilatation.  Biliary papillotomy and stricture dilatation performed and brushings taken.  A 10 Fr x 5 cm biliary stent was placed.  Brushings were taken but were negative.  There was sl dilatation of the distal pancreatic duct without discrete stricture.   He was discharged with improving jaundice on .  On  he represented with sepsis due to Klebsiella pneumonia bacteremia and was admitted.  He then was referred to Dr. Brody for evaluation and treatment.  He has been set up for EUS with biopsy next  Reason for call:  Other     Patient called regarding (reason for call): call back    Additional comments: Patient is calling and asking if she can get a script for a bladder infection states she gets these often. Please advise and call patient back with updates please and thank you.    Phone number to reach patient:  Home number on file [phone] (home)    Best Time:  any    Can we leave a detailed message on this number?  YES         Friday for what appears to be a primary pancreatic malignancy in the head of the pancreas.  Dr. Brody has referred him for consideration of neoadjuvant therapy.    INTERVAL HISTORY:  Mr. Phillips is here today with his wife for follow up.  Since his last visit, he reports he has been about the same. He continues to have difficulty with nausea and vomiting. He says if tries to eat or drink, he will have abdominal pain. He has been taking zofran and phenergan prn. He says the phenergan seems to control his nausea better but this makes him very drowsy. He was advised to try reglan again but he has been afraid to do so as he believes this made him weaker.He says the IVF made him feel better last week. He reports ongoing fatigue and weakness but denies any worsening.  His wife says she called Dr. Su' office for follow up to see if there was anything else that could be done given his symptoms. He underwent gastric emptying study and was tested for H.Pylori as well. Patient reports Dr. Su is aware PET/CT has been ordered and is waiting to follow up with results of above tests after scans before proceeding with endoscopy. He struggles with intermittent constipation but this is currently well controlled with senna colace ii BID and Miralax prn. He currently denies pain but says he will typically take oxycodone HS. His wife says his BP has been running low recently. They followed up with Dr. Denney yesterday who has adjusted and given instructions for antihypertensive medications.   He denies any other complaints today.     PAST MEDICAL HISTORY:  Past Medical History:   Diagnosis Date   • Antral gastritis    • Asthma    • Atrial fibrillation (CMS/HCC)     treated with oral blood thinner   • Chest pain    • COPD (chronic obstructive pulmonary disease) (CMS/HCC)    • Coronary artery disease     no stents   • Diabetes mellitus (CMS/HCC)     type 2 - checks sugar 4 times per day    • Duodenitis    • Elevated cholesterol     • Emphysema of lung (CMS/HCC)    • Gallbladder disease     removed    • GERD (gastroesophageal reflux disease)    • Hard to intubate     busted lip last time   • Hyperlipidemia    • Hypertension    • Jaundice    • Kidney stone    • Kidney stones     had lithotripsy and passed 36 kidney stones as well as had one surgically removed.   • Malignant neoplasm of head of pancreas (CMS/HCC) 9/5/2018   • Nausea    • Obstructive sleep apnea on CPAP     compliant with machine    • Palpitations    • Pneumonia 08/2018   • Reflux esophagitis    • Renal failure     stage 3 kidney disease   • Sepsis (CMS/HCC)        PAST SURGICAL HISTORY:  Past Surgical History:   Procedure Laterality Date   • BILE DUCT STENT PLACEMENT      [facility] 2 weeks ago    • CARDIAC CATHETERIZATION  11/01/1999   • CARDIOVASCULAR STRESS TEST  09/2012   • CHOLECYSTECTOMY N/A 8/10/2018    Procedure: CHOLECYSTECTOMY LAPAROSCOPIC;  Surgeon: Ronak Downs MD;  Location: [facility] OR;  Service: General   • COLONOSCOPY     • CYSTOSCOPY BLADDER STONE LITHOTRIPSY     • ECHO - CONVERTED  09/2012   • ERCP N/A 8/14/2018    Procedure: ENDOSCOPIC RETROGRADE CHOLANGIOPANCREATOGRAPHY WITH PAPILLOTOMY;  Surgeon: Scot Su MD;  Location: [facility] OR;  Service: Gastroenterology   • ERCP N/A 10/1/2018    Procedure: ENDOSCOPIC RETROGRADE CHOLANGIOPANCREATOGRAPHY;  Surgeon: Jefry Luna MD;  Location: [facility] ENDOSCOPY;  Service: Gastroenterology   • KIDNEY STONE SURGERY     • KIDNEY STONE SURGERY      open abdominal surgery   • PORTACATH PLACEMENT Right 10/23/2018    Procedure: INSERTION OF PORTACATH;  Surgeon: Ronak Downs MD;  Location: [facility] OR;  Service: General   • TONSILLECTOMY     • UPPER GASTROINTESTINAL ENDOSCOPY  08/30/2012   • WHIPPLE PROCEDURE N/A 12/31/2018    Procedure: WHIPPLE PROCEDURE, BIOPSY OF LIVER, PORTAL VEIN RESECTION AND REPAIR, G/J TUBE PLACEMENT;  Surgeon: Miquel Brody MD;  Location: [facility]  OR;  Service: General       FAMILY HISTORY:  Family History   Problem Relation Age of Onset   • Heart attack Mother    • Heart disease Mother    • Stroke Mother    • Kidney disease Mother    • Heart failure Mother    • Lung disease Father    • Tuberculosis Father    • Diabetes Sister    • Heart attack Brother    • Heart failure Brother    • Heart disease Brother    • Diabetes Sister    • No Known Problems Brother    • No Known Problems Brother        SOCIAL HISTORY:  Social History     Socioeconomic History   • Marital status:      Spouse name: Not on file   • Number of children: Not on file   • Years of education: Not on file   • Highest education level: Not on file   Tobacco Use   • Smoking status: Never Smoker   • Smokeless tobacco: Never Used   Substance and Sexual Activity   • Alcohol use: No   • Drug use: No   • Sexual activity: Defer     Partners: Female   Social History Narrative    He is  and lives alone with his wife although they have 3 children together who all live close by. He is retired from the [employer] and was exposed to Agent Orange during . He is a lifelong nonsmoker.         A DIL  of cancer.    REVIEW OF SYSTEMS:   A comprehensive 14 point review of systems was performed.  Significant findings as mentioned above.  All other systems reviewed and are negative.      MEDICATIONS:  The current medication list was reviewed in the EMR    Current Outpatient Medications:   •  atenolol (TENORMIN) 25 MG tablet, Take 25 mg by mouth Daily., Disp: , Rfl:   •  albuterol (PROVENTIL HFA;VENTOLIN HFA) 108 (90 BASE) MCG/ACT inhaler, Inhale 2 puffs Every 4 (Four) Hours As Needed for Wheezing or Shortness of Air., Disp: , Rfl:   •  albuterol (PROVENTIL) (2.5 MG/3ML) 0.083% nebulizer solution, Take 2.5 mg by nebulization 2 (Two) Times a Day As Needed for Wheezing or Shortness of Air., Disp: , Rfl:   •  allopurinol (ZYLOPRIM) 100 MG tablet, Take 1 tablet by mouth Daily., Disp: 90 tablet, Rfl:  0  •  apixaban (ELIQUIS) 5 MG tablet tablet, Take 1 tablet by mouth Every 12 (Twelve) Hours., Disp: 60 tablet, Rfl:   •  cholecalciferol (VITAMIN D3) 1000 units tablet, Take 1,000 Units by mouth Daily., Disp: , Rfl:   •  colchicine 0.6 MG tablet, Take 1 tablet by mouth Daily. (Patient taking differently: Take 0.6 mg by mouth As Needed.), Disp: 90 tablet, Rfl: 2  •  flecainide (TAMBOCOR) 50 MG tablet, Take 1 tablet by mouth Every 12 (Twelve) Hours., Disp: 60 tablet, Rfl: 0  •  FLUoxetine (PROzac) 20 MG capsule, Take 20 mg by mouth 2 (Two) Times a Day., Disp: , Rfl:   •  insulin lispro (humaLOG) 100 UNIT/ML injection, Inject  under the skin into the appropriate area as directed 3 (Three) Times a Day Before Meals., Disp: , Rfl:   •  lactobacillus acidophilus (RISAQUAD) capsule capsule, Take 1 capsule by mouth Daily., Disp: 30 capsule, Rfl: 0  •  mometasone (ASMANEX) 220 MCG/INH inhaler, Inhale 2 puffs Every Night., Disp: , Rfl:   •  montelukast (SINGULAIR) 10 MG tablet, Take 10 mg by mouth Every Night., Disp: , Rfl:   •  nitroglycerin (NITROSTAT) 0.4 MG SL tablet, Place 0.4 mg under the tongue as needed for chest pain., Disp: , Rfl:   •  omeprazole (priLOSEC) 40 MG capsule, Take 40 mg by mouth Daily., Disp: , Rfl:   •  ondansetron (ZOFRAN) 8 MG tablet, Take 1 tablet by mouth Every 8 (Eight) Hours As Needed for Nausea or Vomiting., Disp: 60 tablet, Rfl: 3  •  oxyCODONE (ROXICODONE) 5 MG immediate release tablet, Take 1-2 tabs every 4-6 hours as needed for pain, Disp: 100 tablet, Rfl: 0  •  pancrelipase, Lip-Prot-Amyl, (CREON) 50884 units capsule delayed-release particles capsule, Take 12,000 units of lipase by mouth 3 (Three) Times a Day With Meals., Disp: , Rfl:   •  polyethylene glycol (MIRALAX) packet, Take 17 g by mouth Daily As Needed., Disp: , Rfl:   •  promethazine (PHENERGAN) 12.5 MG tablet, Take 1 tablet by mouth Every 6 (Six) Hours As Needed for Nausea or Vomiting., Disp: 90 tablet, Rfl: 3  •  Tiotropium  Bromide-Olodaterol 2.5-2.5 MCG/ACT aerosol solution, Inhale 2 puffs Daily., Disp: , Rfl:   No current facility-administered medications for this visit.     Facility-Administered Medications Ordered in Other Visits:   •  heparin flush (porcine) 100 UNIT/ML injection 500 Units, 500 Units, Intravenous, PRN, Gwen Alves MD, 500 Units at 03/27/19 1514  •  sodium chloride 0.9 % flush 10 mL, 10 mL, Intravenous, PRN, Gwen Alves MD, 10 mL at 03/27/19 1514  •  sodium chloride 0.9 % infusion  - ADS Override Pull, , , ,   •  sodium chloride 0.9 % infusion  - ADS Override Pull, , , ,     ALLERGIES:    Allergies   Allergen Reactions   • Ativan [Lorazepam] Mental Status Change   • Chlorhexidine Hives and Rash   • Contrast Dye Other (See Comments)     Can't have due to kidney failure per family   • Fentanyl Confusion and Unknown (See Comments)     Patient family reports extreme symptoms with fentanyl. Including confusion, restlessness, irritability an heavy sedation.     PHYSICAL EXAM:  Vitals:    03/27/19 1319   BP: (!) 79/55   Pulse: 71   Resp: 18   Temp: 97.9 °F (36.6 °C)   SpO2: 97%   General:  Alert and oriented.  Appears fatigued.   HEENT:  Pupils are equal, round and reactive to light and accommodation, Extra-ocular movements full, Oropharyx clear, MM tachy  Neck:  No JVD, thyromegaly or lymphadenopathy  CV:  Regular rate/rhythm, no murmurs, rubs or gallops  Resp:  Lungs are clear to auscultation bilaterally  Abd:  Soft, diffuse tenderness with palpation, non-distended, bowel sounds normal, no organomegaly or masses.  Surgical incisions well-healed.  Ext:  No clubbing, cyanosis or edema  Lymph:  No cervical, supraclavicular, axillary,adenopathy  Neuro: grossly non-focal exam    PATHOLOGY:  08-15-18         12-31-18 Whipple  Final Diagnosis   1. LIVER, WEDGE BIOPSY:  Small bland ductular proliferation compatible with bile duct adenoma.   Negative for metastatic carcinoma.   2. OMENTUM:  Mild chronic  inflammation and surface adhesions; negative for neoplasm.   3. HEPATIC ARTERY LYMPH NODE:  Sinus histiocytosis; single lymph node negative for metastatic carcinoma. (0/1)  4. SUBMITTED BILE DUCT MARGIN:  Margin with chronic inflammation; negative for adenocarcinoma.   5. AORTA-CAVAL LYMPH NODES:  Two lymph nodes negative for metastatic carcinoma. (0/2)  6. PANCREATODUODENECTOMY (WHIPPLE PROCEDURE);  Invasive moderately differentiated adenocarcinoma arising in head of pancreas and involving duodenum.  Gross tumor size 3.7 cm in greatest dimension.  Perineural and peripancreatic extension identified.   Soft tissue at superior mesenteric artery positive for neoplasm.  Posterior/uncinate margin involved by neoplasm.  Five of twelve peripancreatic lymph nodes positive for metastatic neoplasm. See Template.        PANCREAS EXOCRINE:  TYPE OF SPECIMEN/PROCEDURE: Whipple procedure  SPECIMEN INTEGRITY: Intact  TUMOR SITE (HEAD, BODY, TAIL): Head  TUMOR SIZE (GREATEST DIMENSION): 3.7 cm greatest dimension  HISTOLOGIC TYPE: Adenocarcinoma  HISTOLOGIC rdGrdRrdArdDrdErd:rd rd3rd TUMOR EXTENSION: Neoplasm involves duodenum and peripancreatic fat  SURGICAL MARGINS, IF INVOLVED, (SPECIFY WHERE): Involved  MARGINS EXAMINED: See below  PARENCHYMAL MARGIN: Not involved  UNCINATE: Involved  BILE DUCT: Not involved  DISTAL MARGIN: Not involved   PROXIMAL MARGIN: Not involved   OTHER MARGINS: SMA margin involved   CLOSEST MARGIN: Margins involved    SPECIFIC MARGIN: SMA and posterior/uncinate  INVADES CELIAC AXIS OR SMA: No  VASCULAR/LYMPHATIC INVASION: Present  PERINEURAL INVASION:  Present  REGIONAL LYMPH NODES: Submitted  NUMBER OF LYMPH NODES INVOLVED: 5  NUMBER OF LYMPH NODES EXAMINED: 15  EXTRACAPSULAR EXTENSION: Focally present  TREATMENT EFFECT: No known presurgical therapy  ADDITIONAL PATHOLOGIC FINDINGS: Chronic pancreatitis   OTHER STUDIES: Available upon request  AJCC PATHOLOGIC STAGE: (COMPLETED BY PATHOLOGIST BASED ONLY ON TISSUE FINDINGS,  MORE EXTENSIVE DISEASE MAY NOT BE KNOW TO THE PATHOLOGIST)  pT=  2   pN= 2  AJCC PATHOLOGIC STAGE: III     19 Abdominal Fluid:  Final Diagnosis    ABDOMINAL FLUID:  Negative for malignancy.  Acute inflammation and necrotic debris.  No tumor seen.         ENDOSCOPY:  18 ERCP with papillotomy, balloon rotation of the biliary stricture, pathology brushings, ileum stent placement (Georges)  1.  Irregular 3 cm distal common bile duct stricture concerning for neoplastic disease. Biliary papillotomy, stricture dilatation by  through the scope balloon, cytology brushings performed and 10 Frisian by 5 cm biliary stent placed.    2.  Dilated common hepatic duct to 15 mm when fully contrast filled.  Dilated intrahepatic biliary tree.    3.  No stones proximal to the stricture were identified.    4.  Slight dilatation of the distal pancreatic duct without a discrete stricture identified.    IMAGIN18 CT Abdomen Pelvis Stone Protocol   Findings  - LOWER THORAX: Clear. No effusions.  - LIVER: Homogeneous. No focal hepatic mass or ductal dilatation.  - GALLBLADDER: MINIMAL STRANDING AROUND REGION OF GALLBLADDER FOSSA  THAT MAY BE POSTSURGICAL.  - PANCREAS: Unremarkable. No mass or ductal dilatation.  - SPLEEN: Homogeneous. No splenomegaly.  - ADRENALS: No mass.  - KIDNEYS: No mass. No obstructive uropathy.  No evidence of urolithiasis.  - GI TRACT: SMALL HIATAL HERNIA.  - PERITONEUM: No free air. No free fluid or loculated fluid collections.  - MESENTERY: Unremarkable.  - LYMPH NODES: No lymphadenopathy.  - VASCULATURE: ATHEROSCLEROTIC VASCULAR CALCIFICATION.  - ABDOMINAL WALL: No focal hernia or mass.  - OTHER: None.  - BLADDER: No focal mass or significant wall thickening  - REPRODUCTIVE: Unremarkable as visualized.  - APPENDIX: Nondistended. No surrounding inflammation.  - BONES: No acute bony abnormality.     IMPRESSION:  - Minimal stranding around region of gallbladder fossa that may be postsurgical.      08-13-18 US Liver   FINDINGS:   - Visualized pancreas is unremarkable.   - The gallbladder is absent. No collection identified.   - The CBD measures 10.66742638309 mm    .  - The liver demonstrates normal echogenicity without focal lesion.    - No ascites demonstrated.        IMPRESSION:  - As above.    08-22-18 CT Abdomen Pelvis Stone Protocol   FINDINGS:   - Minimal bibasilar atelectasis.  - Hiatal hernia.  - The liver is homogeneous. There is no evidence of focal hepatic mass.  - The spleen is homogeneous.  - Stent is noted traversing the common bile duct.  - 3 mm nodule in the right lung on image 8 of the axial series.  - There is no adrenal enlargement.  - The kidneys show no evidence of hydronephrosis or hydroureter. I do not see any distal ureteral stones.   - Otherwise I do not see any free fluid or walled off fluid collections.  - There is moderate to large volume stool in the colon.  - There is no evidence of mesenteric or retroperitoneal adenopathy.     IMPRESSION:  1. Tiny nodule in the right lung base.  2. Minimal bibasilar atelectasis.  3. Moderate to large volume stool in the colon.     Other findings as above.    08-22-18 CT Abdomen Pelvis With Contrast   FINDINGS:   - Tiny left basilar nodule measuring 4 mm on image 14 of the axial series.  - Minimal bibasilar atelectasis.  - The liver is homogeneous. There is no evidence of focal hepatic mass  - The spleen is homogeneous  - Indistinct appearance of the pancreatic head. A biliary stent is present.  - Small left adrenal nodule is present.  - The kidneys show no evidence of hydronephrosis or hydroureter. I do not see any distal ureteral stones.   - Otherwise I do not see any free fluid or walled off fluid collections.  - Large volume stool is present in the colon.     IMPRESSION:  1. Slightly enlarged, indistinct appearance of the pancreatic head.  - Underlying neoplasm certainly not excluded but no delineable mass is present. There is a biliary  stent.    2. Tiny nodule in the left lung base.    3. Small left adrenal nodule.    4. Hiatal hernia.    5. Moderate to large volume stool in the colon.      09-11-18 CT Chest Without Contrast   FINDINGS:   - Minimal coronary artery calcifications present.  - No pericardial or pleural effusions.  - No parenchymal soft tissue nodules or masses. There are findings of COPD.     IMPRESSION:  - COPD.  - Hiatal hernia.  - Minimal coronary artery calcification.       09-11-18 CT Abdomen Pelvis Without Contrast   FINDINGS:   - Lung bases show mild increased interstitial markings.  - There is a hiatal hernia.  - The liver is homogeneous. There is no evidence of focal hepatic mass.  - The spleen is homogeneous.  - Mildly indistinct appearance of the pancreas. There is a biliary stent present. I cannot exclude mild degree of pancreatitis..  - There is no adrenal enlargement.  - The kidneys show no evidence of hydronephrosis or hydroureter. I do not see any distal ureteral stones.   - Otherwise I do not see any free fluid or walled off fluid collections.  - Large volume stool is present in the colon.  - Urinary bladder wall is slightly thickened.  - There is no evidence of mesenteric or retroperitoneal adenopathy.    IMPRESSION:  1. Mildly indistinct appearance of the proximal pancreas.  2. Urinary bladder wall thickening.  3. Large volume stool in the colon.    09-14-18 CT Abdomen Without Contrast   FINDINGS:   - Again noted is very mild indistinct appearance of the pancreatic head. Stent is stable in position.  - The liver is stable and homogeneous.  - Spleen is homogeneous.  - No adrenal enlargement.  - Moderate to large volume stool in the colon.     IMPRESSION:  - No significant interval change since the previous exam.     09-21-18 NM Pet Skull Base To Mid Thigh   FINDINGS:      HEAD/NECK:  - No FDG hypermetabolic neck adenopathy.  - No FDG hypermetabolic masses.     CHEST:   - No FDG hypermetabolic thoracic adenopathy.  -  No FDG hypermetabolic lung nodules or masses.  - Evaluation for tiny parenchymal nodules is somewhat limited on low dose CT secondary to respiratory motion.     ABDOMEN/PELVIS:   - There is hypermetabolic activity in the pancreatic head with a maximum SUV of 4.971.  - Physiologic FDG hypermetabolism seen throughout the GI tract.  - Physiologic FDG hypermetabolism seen throughout the mesentery, retroperitoneum and pelvis.     BONES:   - There are scattered foci of FDG hypermetabolism most suggestive of degenerative change seen throughout the axial skeleton. If concern persist for metastatic lesions of the bone, HDP Bone scan is recommended for further evaluation.     IMPRESSION:  - Abnormal hypermetabolic activity corresponding to area of indistinctness in the pancreatic head. Maximum SUV is 4.97.    1-20-19 CT CAP with contrast:  Today's study shows mild coronary artery calcifications.     There are bilateral pleural effusions, right greater than left.     There is bibasilar consolidation. I cannot exclude pneumonia.     No parenchymal masses are seen.     IMPRESSION:  Bibasilar effusions and bibasilar consolidation.     There are bibasilar pleural effusions and there is bibasilar  consolidation.     Patient has a percutaneous gastric tube.     There is pneumoperitoneum. This may be from the percutaneous gastric  tube. The tube appears to remain intraluminal but left lateral aspect  could extend beyond the confines of the bowel.     There is a perihepatic fluid collection which contains air and is  concerning for perihepatic abscess.     The liver is homogeneous. There is no evidence of focal hepatic mass     The spleen is homogeneous     There is no peripancreatic stranding or pancreatic head mass.     There is no adrenal enlargement.     The kidneys show no evidence of hydronephrosis or hydroureter. I do not  see any distal ureteral stones.      Small volume ascites is present.     There is no bowel wall  thickening or mesenteric stranding.             IMPRESSION:  :   1. Bibasilar effusions and bibasilar consolidation.  2. Loculated perihepatic fluid collection containing air. This is  concerning for an abscess.  3. Percutaneous gastric tube is present. The tip appears to remain  intraluminal. There is, however, single focus of pneumoperitoneum which  may be associated with gastric tube placement.  4. Small volume ascites.    CT A/P without Contrast 1-24-19:  The percutaneous drain placed on 01/11/2019 and the  perihepatic abscess has been removed. There is persistent loculated  perihepatic fluid although this has markedly improved since prior to  drain placement; the collection did measure 8 x 22 x 9 cm before  drainage as one large collection and now is split into two possibly  separate smaller collections, the more posterior of the two measuring 2  x 10 x 2 cm and the more superior 4 x 6 x 3 cm (the collections may be  narrowly connected).     Percutaneous gastrojejunostomy is in place; status post Whipple. There  is a small amount of low density abdominopelvic free fluid. There is gas  in the nondependent portion of the urinary bladder, probably from recent  instrumentation. There is no evidence of bowel obstruction. Only a tiny  locule of free air persists in the mid abdomen deep to the midline  incision, improved. The kidneys, adrenal glands, pancreas and spleen are  unchanged.     There is mild body wall edema and subcutaneous gas from recent  medication injections. Small volume right and trace volume left pleural  effusions. There is near complete collapse of the right lower lobe but  there is only subsegmental atelectasis at the left lung base. No  pertinent osseous abnormality is seen.     IMPRESSION:  1. Marked improvement in the perihepatic subcapsular abscess. Drainage  catheter has been removed.  2. Unchanged low density free fluid. Unchanged small volume right and  trace left pleural  effusions.    CTCAP 02-26-19:  Findings  LUNGS: BIBASILAR LUNG FIBROTIC CHANGE.  HEART: Unremarkable.  PERICARDIUM: No effusion.  MEDIASTINUM: No masses. No enlarged lymph nodes.  No fluid collections.  PLEURA: TINY LEFT PLEURAL EFFUSION.  MAJOR AIRWAYS: Clear. No intrinsic mass.  VASCULATURE: No evidence of aneurysm.     VISUALIZED UPPER ABDOMEN:  LIVER: Homogeneous. No focal hepatic mass or ductal dilatation.  SPLEEN: Homogeneous. No splenomegaly.  ADRENALS: No mass.  KIDNEYS: No mass. No obstructive uropathy.  No evidence of urolithiasis.  GI TRACT: Non-dilated. No definite wall thickening  PERITONEUM: No free air. No free fluid or loculated fluid collections.  ABDOMINAL WALL: No focal hernia or mass.       OTHER: None.     BONES: No acute bony abnormality.     IMPRESSION:  Now only tiny pleural effusions.    Findings  LOWER THORAX: Clear. No effusions.     ABDOMEN:  LIVER: SUBCAPSULE LIVER COLLECTION MEASURING ABOUT 4.8 CM THAT WAS ABOUT 7.2 CM.  GALLBLADDER: No dilation or stone identified.  PANCREAS: Unremarkable. No mass or ductal dilatation.  SPLEEN: Homogeneous. No splenomegaly.  ADRENALS: No mass.  KIDNEYS: No mass. No obstructive uropathy.  No evidence of urolithiasis.  GI TRACT: Non-dilated. No definite wall thickening.  PERITONEUM: TINY ASCITES.  MESENTERY: Unremarkable.  LYMPH NODES: No lymphadenopathy.  VASCULATURE: No evidence of aneurysm.  ABDOMINAL WALL: No focal hernia or mass.     OTHER: None.     PELVIS:  BLADDER: No focal mass or significant wall thickening  REPRODUCTIVE: Unremarkable as visualized.  APPENDIX: Nondistended. No surrounding inflammation.  BONES: No acute bony abnormality.     IMPRESSION:  Still tiny ascites. Decreased size of a subhepatic collection.    RECENT LABS:  Lab Results   Component Value Date    WBC 6.50 03/27/2019    HGB 9.9 (L) 03/27/2019    HCT 32.0 (L) 03/27/2019    MCV 83.3 03/27/2019    RDW 18.4 (H) 03/27/2019     03/27/2019    NEUTRORELPCT 57.1 03/27/2019     LYMPHORELPCT 32.8 03/27/2019    MONORELPCT 8.5 03/27/2019    EOSRELPCT 1.1 03/27/2019    BASORELPCT 0.3 03/27/2019    NEUTROABS 3.72 03/27/2019    LYMPHSABS 2.13 03/27/2019       Lab Results   Component Value Date     03/27/2019    K 4.1 03/27/2019    CO2 25.1 03/27/2019     03/27/2019    BUN 20 03/27/2019    CREATININE 1.30 (H) 03/27/2019    EGFRIFNONA 55 (L) 03/27/2019    EGFRIFAFRI  09/02/2016      Comment:      <15 Indicative of kidney failure.    GLUCOSE 134 (H) 03/27/2019    CALCIUM 9.4 03/27/2019    ALKPHOS 101 03/27/2019    AST 19 03/27/2019    ALT 11 03/27/2019    BILITOT 0.5 03/27/2019    ALBUMIN 3.38 (L) 03/27/2019    PROTEINTOT 7.4 03/27/2019    MG 1.8 02/01/2019    PHOS 2.8 01/26/2019     Lab Results   Component Value Date    FERRITIN 435.00 (H) 02/20/2019    IRON 11 (L) 02/20/2019    TIBC 196 (L) 02/20/2019    LABIRON 6 (L) 02/20/2019    IPBTZBAT13 1,361 (H) 02/20/2019    FOLATE 19.70 02/20/2019       Lab Results   Component Value Date     129.6 (H) 03/20/2019     85 (H) 02/20/2019     34 01/22/2019     104 (H) 01/07/2019     1349 (H) 12/19/2018     1089 (H) 12/10/2018     1576 (H) 11/13/2018     5149 (H) 10/19/2018     7602 (H) 09/25/2018     3636 (H) 09/07/2018     4032 (H) 08/15/2018     ASSESSMENT & PLAN:  Todd Phillips is a very pleasant 70 y.o. male with Stage III moderately differentiated adenocarcinoma of the head of the pancreas with involvement of the duodenum ([id number]).    1.  Pancreatic cancer:  -  Imaging has shown vague abnormality in the head of the pancreas which is hypermetabolic on PET-CT.  Biopsy was c/w pancreatic cancer and Ca19-9 was markedly elevated.    -  Biliary stent was exchanged for metal stent to relieve obstruction / cholangitis.   -Recommended neoadjuvant chemotherapy with Gemcitabine and Abraxane and he completed one cycle.  He became very weak and had to have TPN support.   -  He underwent surgical  "resection 12-31-18 and had Stage III disease (hqP0T1AM) moderately differentiated adenocarcinoma of the pancreas.  Tumor was 3.7 cm in maximal dimension and 5/15 associated LNs were involved.  Uncinate and SMA margins were involved.    -  There has been question about whether he should take further therapy (chemotherapy and / or radiation), but he has been so weak that he hasn't been able.  -  CT imaging showed no cause for concern.   has been up and down a bit but last value was up.  -  His symptoms are concerning.   - PET-CT is planned to evaluate the source of his abdominal pain. If this is not revealing, may need to return him to Dr. Brody for further evaluation. He has also been seen by GI recently and underwent gastric emptying study and tested for H.Pylori. He will follow up with [facility] after PET/CT and if not revealing, patient says they will possibly do endoscopy. Will request records.   -Will continue with supportive care today and he will follow up next week as scheduled with Dr. Alves     2.  N/V/Dehydration:  -  Will give NS 1L today in the office. Will also arrange IVF at home with home health until acute symptoms improve and are investigated further.    -  Continue Zofran every 8 hours.  He previously took Reglan but felt it \"made him weaker\". We have encouraged him to try this again but he has yet to do so. He says he will give this a try today. He also has phenergan at home which he took and has been helpful. He says his nausea happens after he eats or drinks. Encouraged him to try and take prn antiemetics 30 min prior to eating. Will monitor.      3.  Neoplasm related pain:  -  Currently denies. He takes Oxycodone as needed (typically HS).      4.  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  He previously took oral iron but did not absorb it.  Ordered Feraheme and receiving his first dose today.     5.  Constipation: Improved from last week.Continue Senna/ Colace ii BID " with Miralax as needed.       6.   History of Atrial fibrillation:  Chronically anticoagulated on Eliquis.      7.  Prophylaxis:  Has had 2018 influenza vaccine.      8. Hypotension:   -Recently followed up with Dr. Denney who has adjusted and given instructions for antihypertensive medications. Likely related to dehydration (see management above). Patient advised to change positions slowly.      9.  ACO Quality measures  - Todd Phillips does not use tobacco products.  - Current outpatient and discharge medications have been reconciled for the patient.  Reviewed by:VIMAL Rolle     I spent 25 minutes with Todd Phillips today.  More than 50% of the time was spent in counseling / coordination of care for the above problems.    Electronically Signed by: VIMAL Rolle    CC:   MD Mqiuel Quintana MD Aaron House, MD Michael Simons, MD

## 2023-08-03 NOTE — TELEPHONE ENCOUNTER
Provider Recommendation Follow Up:   Reached patient/caregiver. Informed of provider's recommendations. Patient verbalized understanding and agrees with the plan.     Before writer could finish, pt stated she saw that a script was sent in and was very thankful. No other questions at this time.    TATY LoN, RN  [facility] [place]

## 2023-08-03 NOTE — TELEPHONE ENCOUNTER
"Nurse Triage SBAR    Is this a 2nd Level Triage? YES, LICENSED PRACTITIONER REVIEW IS REQUIRED    Situation:   \"Patient is calling and asking if she can get a script for a bladder infection states she gets these often. Please advise and call patient back with updates please and thank you.\"    Background:   Asked pt to submit e-Visit via [employer]i but pt stated that hs has a history of recurrent UTIs and was on nitrofurantoin PRN. She was hoping provider could just order her some antibiotic, but if provider wants her to submit an e-Visit then she will later.    Assessment:   Patient stated that she has been having urinary urgency, frequency, and burning for the past 48 hours. Feels like it is consistent.    Able to empty bladder fully; no dribbling.  Denied abdominal pain or pelvic pain.  Denied back or side pain.  Denied fever or chills.  Denied foul odor.  Denied painful urination.    Taking AZO, which seems to help a little, but not going away.    Currently not pregnant.    Protocol Recommended Disposition:   See More Appropriate Protocol    Recommendation:   Advised pt to submit e-visit prior to triage but pt declined stating that she has a history or recurrent UTIs and was on nitrofurantoin PRN. She was hoping PCP could just prescribe that for her, but if PCP wants her to do the e-Visit then she will.    Routing to PCP to review and advise.     Routed to provider    Does the patient meet one of the following criteria for ADS visit consideration? 16+ years old, with an [facility] PCP     TIP  Providers, please consider if this condition is appropriate for management at one of our Acute and Diagnostic Services sites.     If patient is a good candidate, please use dotphrase <dot>triageresponse and select Refer to ADS to document.    Reason for Disposition   Pain or burning with passing urine (urination) and female    Additional Information   Negative: Shock suspected (e.g., cold/pale/clammy skin, too weak to stand, low " BP, rapid pulse)   Negative: Sounds like a life-threatening emergency to the triager   Negative: Followed a female genital area injury (e.g., vagina, vulva)   Negative: Followed a male genital area injury (penis, scrotum)   Negative: Vaginal discharge   Negative: Pus (white, yellow) or bloody discharge from end of penis   Negative: Pain or burning with passing urine (urination) and pregnant   Negative: Shock suspected (e.g., cold/pale/clammy skin, too weak to stand, low BP, rapid pulse)   Negative: Sounds like a life-threatening emergency to the triager   Negative: Vomiting   Negative: Unable to urinate (or only a few drops) and bladder feels very full   Negative: Patient sounds very sick or weak to the triager   Negative: SEVERE pain with urination   Negative: Fever > 100.4 F (38.0 C)   Negative: Side (flank) or lower back pain present   Negative: Taking antibiotic > 24 hours for UTI and fever persists   Negative: Taking antibiotic > 3 days for UTI and painful urination not improved   Negative: Unusual vaginal discharge   Negative: > 2 UTIs in last year   Negative: Patient is worried they have a sexually transmitted infection (STI)   Negative: Age > 50 years   Negative: Possibility of pregnancy   Negative: Painful urination AND EITHER frequency or urgency   Negative: All other females with painful urination, or patient wants to be seen   Negative: Taking antibiotic < 24 hours for UTI and fever persists   Negative: Taking antibiotic < 3 days for UTI and painful urination not improved    Protocols used: Urinary Symptoms-A-OH, Urination Pain - Female-A-OH

## 2023-09-28 ENCOUNTER — OFFICE VISIT (OUTPATIENT)
Dept: FAMILY MEDICINE | Facility: CLINIC | Age: 43
End: 2023-09-28
Payer: COMMERCIAL

## 2023-09-28 VITALS
TEMPERATURE: 97 F | BODY MASS INDEX: 34.39 KG/M2 | WEIGHT: 214 LBS | DIASTOLIC BLOOD PRESSURE: 72 MMHG | RESPIRATION RATE: 12 BRPM | OXYGEN SATURATION: 99 % | SYSTOLIC BLOOD PRESSURE: 106 MMHG | HEIGHT: 66 IN | HEART RATE: 75 BPM

## 2023-09-28 DIAGNOSIS — Z00.00 ENCOUNTER FOR ROUTINE HISTORY AND PHYSICAL EXAM IN FEMALE: Primary | ICD-10-CM

## 2023-09-28 DIAGNOSIS — Z11.3 SCREENING FOR STD (SEXUALLY TRANSMITTED DISEASE): ICD-10-CM

## 2023-09-28 DIAGNOSIS — Z13.1 DIABETES MELLITUS SCREENING: ICD-10-CM

## 2023-09-28 DIAGNOSIS — Z79.899 MEDICATION MANAGEMENT: ICD-10-CM

## 2023-09-28 DIAGNOSIS — Z12.4 CERVICAL CANCER SCREENING: ICD-10-CM

## 2023-09-28 DIAGNOSIS — F33.0 MILD EPISODE OF RECURRENT MAJOR DEPRESSIVE DISORDER (H): ICD-10-CM

## 2023-09-28 DIAGNOSIS — Z13.220 LIPID SCREENING: ICD-10-CM

## 2023-09-28 DIAGNOSIS — N39.0 ACUTE UTI: ICD-10-CM

## 2023-09-28 DIAGNOSIS — I10 BENIGN ESSENTIAL HYPERTENSION: ICD-10-CM

## 2023-09-28 DIAGNOSIS — G47.00 INSOMNIA, UNSPECIFIED TYPE: ICD-10-CM

## 2023-09-28 DIAGNOSIS — E66.01 MORBID OBESITY (H): ICD-10-CM

## 2023-09-28 DIAGNOSIS — R30.0 DYSURIA: ICD-10-CM

## 2023-09-28 LAB
ALBUMIN UR-MCNC: NEGATIVE MG/DL
ANION GAP SERPL CALCULATED.3IONS-SCNC: 12 MMOL/L (ref 7–15)
APPEARANCE UR: CLEAR
BACTERIA #/AREA URNS HPF: ABNORMAL /HPF
BILIRUB UR QL STRIP: NEGATIVE
BUN SERPL-MCNC: 9.6 MG/DL (ref 6–20)
CALCIUM SERPL-MCNC: 9.6 MG/DL (ref 8.6–10)
CHLORIDE SERPL-SCNC: 102 MMOL/L (ref 98–107)
CHOLEST SERPL-MCNC: 283 MG/DL
CLUE CELLS: ABNORMAL
COLOR UR AUTO: ABNORMAL
CREAT SERPL-MCNC: 0.94 MG/DL (ref 0.51–0.95)
EGFRCR SERPLBLD CKD-EPI 2021: 77 ML/MIN/1.73M2
GLUCOSE SERPL-MCNC: 107 MG/DL (ref 70–99)
GLUCOSE UR STRIP-MCNC: NEGATIVE MG/DL
HBA1C MFR BLD: 5.6 % (ref 0–5.6)
HCO3 SERPL-SCNC: 25 MMOL/L (ref 22–29)
HDLC SERPL-MCNC: 32 MG/DL
HGB BLD-MCNC: 11.9 G/DL (ref 11.7–15.7)
HGB UR QL STRIP: ABNORMAL
KETONES UR STRIP-MCNC: NEGATIVE MG/DL
LDLC SERPL CALC-MCNC: 185 MG/DL
LEUKOCYTE ESTERASE UR QL STRIP: NEGATIVE
NITRATE UR QL: POSITIVE
NONHDLC SERPL-MCNC: 251 MG/DL
PH UR STRIP: 5.5 [PH] (ref 5–8)
POTASSIUM SERPL-SCNC: 4.8 MMOL/L (ref 3.4–5.3)
RBC #/AREA URNS AUTO: ABNORMAL /HPF
SODIUM SERPL-SCNC: 139 MMOL/L (ref 135–145)
SP GR UR STRIP: 1.01 (ref 1–1.03)
SQUAMOUS #/AREA URNS AUTO: ABNORMAL /LPF
TRICHOMONAS, WET PREP: ABNORMAL
TRIGL SERPL-MCNC: 331 MG/DL
UROBILINOGEN UR STRIP-ACNC: 0.2 E.U./DL
WBC #/AREA URNS AUTO: ABNORMAL /HPF
WBC'S/HIGH POWER FIELD, WET PREP: ABNORMAL
YEAST, WET PREP: ABNORMAL

## 2023-09-28 PROCEDURE — 87591 N.GONORRHOEAE DNA AMP PROB: CPT | Performed by: NURSE PRACTITIONER

## 2023-09-28 PROCEDURE — 87491 CHLMYD TRACH DNA AMP PROBE: CPT | Performed by: NURSE PRACTITIONER

## 2023-09-28 PROCEDURE — 80061 LIPID PANEL: CPT | Performed by: NURSE PRACTITIONER

## 2023-09-28 PROCEDURE — 90686 IIV4 VACC NO PRSV 0.5 ML IM: CPT | Performed by: NURSE PRACTITIONER

## 2023-09-28 PROCEDURE — 36415 COLL VENOUS BLD VENIPUNCTURE: CPT | Performed by: NURSE PRACTITIONER

## 2023-09-28 PROCEDURE — 99396 PREV VISIT EST AGE 40-64: CPT | Mod: 25 | Performed by: NURSE PRACTITIONER

## 2023-09-28 PROCEDURE — 81001 URINALYSIS AUTO W/SCOPE: CPT | Performed by: NURSE PRACTITIONER

## 2023-09-28 PROCEDURE — 87624 HPV HI-RISK TYP POOLED RSLT: CPT | Performed by: NURSE PRACTITIONER

## 2023-09-28 PROCEDURE — 90472 IMMUNIZATION ADMIN EACH ADD: CPT | Performed by: NURSE PRACTITIONER

## 2023-09-28 PROCEDURE — G0145 SCR C/V CYTO,THINLAYER,RESCR: HCPCS | Performed by: NURSE PRACTITIONER

## 2023-09-28 PROCEDURE — 80048 BASIC METABOLIC PNL TOTAL CA: CPT | Performed by: NURSE PRACTITIONER

## 2023-09-28 PROCEDURE — 87086 URINE CULTURE/COLONY COUNT: CPT | Performed by: NURSE PRACTITIONER

## 2023-09-28 PROCEDURE — 90677 PCV20 VACCINE IM: CPT | Performed by: NURSE PRACTITIONER

## 2023-09-28 PROCEDURE — 99214 OFFICE O/P EST MOD 30 MIN: CPT | Mod: 25 | Performed by: NURSE PRACTITIONER

## 2023-09-28 PROCEDURE — 90471 IMMUNIZATION ADMIN: CPT | Performed by: NURSE PRACTITIONER

## 2023-09-28 PROCEDURE — 96127 BRIEF EMOTIONAL/BEHAV ASSMT: CPT | Performed by: NURSE PRACTITIONER

## 2023-09-28 PROCEDURE — 87210 SMEAR WET MOUNT SALINE/INK: CPT | Performed by: NURSE PRACTITIONER

## 2023-09-28 PROCEDURE — 85018 HEMOGLOBIN: CPT | Performed by: NURSE PRACTITIONER

## 2023-09-28 PROCEDURE — 83036 HEMOGLOBIN GLYCOSYLATED A1C: CPT | Performed by: NURSE PRACTITIONER

## 2023-09-28 RX ORDER — TRAZODONE HYDROCHLORIDE 50 MG/1
25-50 TABLET, FILM COATED ORAL
Qty: 60 TABLET | Refills: 0 | Status: SHIPPED | OUTPATIENT
Start: 2023-09-28 | End: 2023-12-01

## 2023-09-28 RX ORDER — PAROXETINE 30 MG/1
TABLET, FILM COATED ORAL
Qty: 180 TABLET | Refills: 3 | Status: SHIPPED | OUTPATIENT
Start: 2023-09-28

## 2023-09-28 RX ORDER — LISINOPRIL AND HYDROCHLOROTHIAZIDE 20; 25 MG/1; MG/1
1 TABLET ORAL DAILY
Qty: 90 TABLET | Refills: 3 | Status: SHIPPED | OUTPATIENT
Start: 2023-09-28

## 2023-09-28 RX ORDER — BUPROPION HYDROCHLORIDE 150 MG/1
TABLET ORAL
Qty: 90 TABLET | Refills: 3 | Status: SHIPPED | OUTPATIENT
Start: 2023-09-28

## 2023-09-28 RX ORDER — SULFAMETHOXAZOLE/TRIMETHOPRIM 800-160 MG
1 TABLET ORAL 2 TIMES DAILY
Qty: 6 TABLET | Refills: 0 | Status: SHIPPED | OUTPATIENT
Start: 2023-09-28 | End: 2023-10-01

## 2023-09-28 ASSESSMENT — ENCOUNTER SYMPTOMS
ABDOMINAL PAIN: 0
NERVOUS/ANXIOUS: 0
CONSTIPATION: 0
HEMATOCHEZIA: 0
JOINT SWELLING: 0
FEVER: 0
HEMATURIA: 0
COUGH: 0
CHILLS: 0
ARTHRALGIAS: 0
DYSURIA: 1
NAUSEA: 0
EYE PAIN: 0
SHORTNESS OF BREATH: 0
PARESTHESIAS: 0
HEADACHES: 0
WEAKNESS: 0
DIARRHEA: 0
DIZZINESS: 0
SORE THROAT: 0
PALPITATIONS: 0
HEARTBURN: 0
FREQUENCY: 1
MYALGIAS: 0

## 2023-09-28 ASSESSMENT — PATIENT HEALTH QUESTIONNAIRE - PHQ9
10. IF YOU CHECKED OFF ANY PROBLEMS, HOW DIFFICULT HAVE THESE PROBLEMS MADE IT FOR YOU TO DO YOUR WORK, TAKE CARE OF THINGS AT HOME, OR GET ALONG WITH OTHER PEOPLE: SOMEWHAT DIFFICULT
SUM OF ALL RESPONSES TO PHQ QUESTIONS 1-9: 3
SUM OF ALL RESPONSES TO PHQ QUESTIONS 1-9: 3

## 2023-09-28 ASSESSMENT — PAIN SCALES - GENERAL: PAINLEVEL: NO PAIN (0)

## 2023-09-28 NOTE — PROGRESS NOTES
Assessment and Plan:    Encounter for routine history and physical exam in female  Recommend consuming a healthy diet and exercising.  She declines hepatitis B vaccine.  Pneumococcal and influenza vaccines provided.  - Hemoglobin  - Hemoglobin    Screening for STD (sexually transmitted disease)  Discussed safe sex practices.  Will notify patient of results.  - Chlamydia trachomatis PCR  - Neisseria gonorrhoeae PCR    Diabetes mellitus screening  - Hemoglobin A1c  - Hemoglobin A1c    Lipid screening  - Lipid panel reflex to direct LDL Fasting  - Lipid panel reflex to direct LDL Fasting    Cervical cancer screening  - Pap Screen with HPV - recommended age 30 - 65 years    Insomnia, unspecified type  Discussed good sleep hygiene.  We will start trazodone as needed.  Educated on its indications and side effects.  Discussed risk of serotonin syndrome while taking paroxetine.  - traZODone (DESYREL) 50 MG tablet  Dispense: 60 tablet; Refill: 0    Acute UTI  Urinalysis is positive for nitrates and trace blood.  We will treat for urinary tract infection with Bactrim DS twice daily for 3 days.  Educated on indications and side effects.  Discussed increasing fluid intake and methods of preventing urinary tract infections in the future.  - sulfamethoxazole-trimethoprim (BACTRIM DS) 800-160 MG tablet  Dispense: 6 tablet; Refill: 0    Dysuria  Wet prep is negative for vaginal infection.  We will treat for urinary tract infection.  - Wet prep - Clinic Collect  - UA Macroscopic with reflex to Microscopic and Culture  - UA Macroscopic with reflex to Microscopic and Culture  - Urine Microscopic Exam  - Urine Culture    Mild episode of recurrent major depressive disorder (H)  This is controlled.  She continues paroxetine and bupropion.  - PARoxetine (PAXIL) 30 MG tablet  Dispense: 180 tablet; Refill: 3  - buPROPion (WELLBUTRIN XL) 150 MG 24 hr tablet  Dispense: 90 tablet; Refill: 3    Morbid obesity (H)  Discussed treatment options.   We will start Wegovy, use as directed.  Educated on its indications and side effects.  Anticipate dose increase in 1 month.  - Semaglutide-Weight Management (WEGOVY) 0.25 MG/0.5ML pen  Dispense: 2 mL; Refill: 0    Benign essential hypertension  This is controlled.  She continues lisinopril-hydrochlorothiazide.  - lisinopril-hydrochlorothiazide (ZESTORETIC) 20-25 MG tablet  Dispense: 90 tablet; Refill: 3  - Semaglutide-Weight Management (WEGOVY) 0.25 MG/0.5ML pen  Dispense: 2 mL; Refill: 0    Medication management  - Basic metabolic panel  (Ca, Cl, CO2, Creat, Gluc, K, Na, BUN)  - Basic metabolic panel  (Ca, Cl, CO2, Creat, Gluc, K, Na, BUN)      Subjective:     Rosa Maria is a 42 year old female presenting to the clinic for a female physical.     LMP: 23 regular once/month   Hx of abnormal pap smear: yes, at age 19 colposcopy, LEEP performed   Last pap smear: 22 normal, negative HPV   Perform self-breast exams: none   Vaginal discharge or irritation: none   Sexually active: yes,  for 20 years   Contraception:  with vasectomy   Concerns for STDs: none   Previous pregnancies:five pregnancies (1 , 1 miscarriage, and 3 live births all )  Kids are 12, 16, 18 (all boys)    Patient has hypertension which is controlled with lisinopril-hydrochlorothiazide 20-25 mg daily.  Patient is taking paroxetine 60 mg daily and bupropion  mg daily for depression.  Symptoms are well controlled.  She denies thoughts of suicide.  She feels well supported.  Patient has been experiencing insomnia for multiple months.  She has tried melatonin and hydroxyzine in the past.  She does vape marijuana in the evening which provides some assistance.  She feels as though she has a regular sleep pattern.  Patient takes nitrofurantoin as needed after sexual intercourse for urinary tract infection prophylaxis.  She developed urinary frequency, dysuria, urinary urgency on Monday evening.  She denies vaginal  discharge or irritation.  She has tried Azo.  She is interested in STD screening today.  Patient is interested in trying Wegovy to assist with weight loss.  She has tried numerous diet and exercise programs in the past.  She denies any personal history of gallbladder or pancreatic abnormalities.  She denies any personal or family history of thyroid cancer.      Review of systems:  I performed a 10 point review of systems.  All pertinent positives and negatives are noted in the HPI. All others are negative.     No Known Allergies    Current Outpatient Medications   Medication    buPROPion (WELLBUTRIN XL) 150 MG 24 hr tablet    hydrOXYzine (ATARAX) 25 MG tablet    lisinopril-hydrochlorothiazide (ZESTORETIC) 20-25 MG tablet    methocarbamol (ROBAXIN) 500 MG tablet    nitroFURantoin macrocrystal-monohydrate (MACROBID) 100 MG capsule    PARoxetine (PAXIL) 30 MG tablet    gabapentin (NEURONTIN) 300 MG capsule    methylPREDNISolone (MEDROL DOSEPAK) 4 MG tablet therapy pack    oxyCODONE (ROXICODONE) 5 MG tablet     No current facility-administered medications for this visit.       Social History     Socioeconomic History    Marital status:      Spouse name: Not on file    Number of children: Not on file    Years of education: Not on file    Highest education level: Not on file   Occupational History    Not on file   Tobacco Use    Smoking status: Every Day     Packs/day: 0.50     Types: Cigarettes     Passive exposure: Current    Smokeless tobacco: Never   Substance and Sexual Activity    Alcohol use: No    Drug use: Yes     Types: Marijuana     Comment: 2x a week    Sexual activity: Yes     Partners: Male     Comment:    Other Topics Concern    Not on file   Social History Narrative    Not on file     Social Determinants of Health     Financial Resource Strain: High Risk (9/28/2023)    Financial Resource Strain     Within the past 12 months, have you or your family members you live with been unable to get  utilities (heat, electricity) when it was really needed?: Yes   Food Insecurity: Low Risk  (9/28/2023)    Food Insecurity     Within the past 12 months, did you worry that your food would run out before you got money to buy more?: No     Within the past 12 months, did the food you bought just not last and you didn t have money to get more?: No   Transportation Needs: Low Risk  (9/28/2023)    Transportation Needs     Within the past 12 months, has lack of transportation kept you from medical appointments, getting your medicines, non-medical meetings or appointments, work, or from getting things that you need?: No   Physical Activity: Not on file   Stress: Not on file   Social Connections: Not on file   Interpersonal Safety: Low Risk  (9/28/2023)    Interpersonal Safety     Do you feel physically and emotionally safe where you currently live?: Yes     Within the past 12 months, have you been hit, slapped, kicked or otherwise physically hurt by someone?: No     Within the past 12 months, have you been humiliated or emotionally abused in other ways by your partner or ex-partner?: No   Housing Stability: Low Risk  (9/28/2023)    Housing Stability     Do you have housing? : Yes     Are you worried about losing your housing?: Patient refused       Past Medical History:   Diagnosis Date    Hypertension     Motion sickness        Family History   Problem Relation Age of Onset    Lung Cancer Mother     Thyroid Disease Mother     Throat cancer Father     Alcoholism Father     Hypertension Maternal Grandmother     Prostate Cancer Maternal Grandfather        Past Surgical History:   Procedure Laterality Date    C/SECTION, CLASSICAL      COLONOSCOPY N/A 7/12/2022    Procedure: COLONOSCOPY, WITH POLYPECTOMY AND BIOPSY;  Surgeon: Brenton Quiroga MD;  Location:  GI    LAMINECTOMY LUMBAR ONE LEVEL Left 10/21/2022    Procedure: Left  hemilaminectomy, medial facetectomy, foraminotomy, microdiscectomies at lumbar 5 - sacral 1;   "Surgeon: Tanja Mccoy MD;  Location: [facility] OR    [facility] [place], CERVICAL      at the age of 19    TEAR DUCT SURGERY         Objective:     /72   Pulse 75   Temp 97  F (36.1  C)   Resp 12   Ht 1.676 m (5' 6\")   Wt 97.1 kg (214 lb)   LMP 09/05/2023 (Approximate)   SpO2 99%   BMI 34.54 kg/m      Patient is alert, no obvious distress.   Skin: Warm, dry.  No rashes or lesions. Skin turgor rapid return.   HEENT:  Eyes normal.  Ears normal.  Nose patent, mucosa pink.  Oropharynx mucosa pink, no lesions or tonsil enlargement.   Neck:  Supple, without lymphadenopathy, bruits, JVD. Thyroid normal texture and size.    Lungs:  Clear to auscultation.  No wheezing, rales noted.  Respirations even and unlabored.   Heart:  Regular rate and rhythm.  No murmurs.   Breasts:  Normal.  No surrounding adenopathy.   Abdomen: Soft, nontender.  No organomegaly.  Bowel sounds normoactive.  No guarding or masses noted.   :  External genitalia normal.  Normal vaginal mucosa.  Cervix no lesions or cervical motion tenderness.  Musculoskeletal:  Full ROM of extremities.  Muscle strength equal +5/5.   Neurological:  Cranial nerves 2-12 intact.            Answers submitted by the patient for this visit:  Patient Health Questionnaire (Submitted on 9/28/2023)  If you checked off any problems, how difficult have these problems made it for you to do your work, take care of things at home, or get along with other people?: Somewhat difficult  PHQ9 TOTAL SCORE: 3  Annual Preventive Visit (Submitted on 9/28/2023)  Chief Complaint: Annual Exam:  Frequency of exercise:: 1 day/week  Getting at least 3 servings of Calcium per day:: NO  Diet:: Regular (no restrictions)  Taking medications regularly:: Yes  Medication side effects:: None  Bi-annual eye exam:: NO  Dental care twice a year:: Yes  Sleep apnea or symptoms of sleep apnea:: None  abdominal pain: No  Blood in stool: No  Blood in urine: No  chest pain: No  chills: " No  congestion: No  constipation: No  cough: No  diarrhea: No  dizziness: No  ear pain: No  eye pain: No  nervous/anxious: No  fever: No  frequency: Yes  genital sores: No  headaches: No  hearing loss: No  heartburn: No  arthralgias: No  joint swelling: No  peripheral edema: No  mood changes: No  myalgias: No  nausea: No  dysuria: Yes  palpitations: No  Skin sensation changes: No  sore throat: No  urgency: Yes  rash: No  shortness of breath: No  visual disturbance: No  weakness: No  Additional concerns today:: Yes  Exercise outside of work (Submitted on 9/28/2023)  Chief Complaint: Annual Exam:  Duration of exercise:: Less than 15 minutes

## 2023-09-28 NOTE — COMMUNITY RESOURCES LIST (ENGLISH)
09/28/2023    [employer] [place] [employer]  N/A  For questions about this resource list or additional care needs, please contact your primary care clinic or care manager.  Phone: [phone]   Email: N/A   Address: [address] [place] [id number]   Hours: N/A        Financial Stability       Utility payment assistance  1  [place] [employer] - [place]'s Telephone Assistance Plan (TAP) and Federal Lifeline and Affordable Connectivity Program (ACP) Distance: 1.07 miles      Phone/Virtual   [address] [place] [address]  Language: English  Fees: Free   Phone: (306) 304-3690 Email: consumer.puc@[facility]. Website: https://[place].gov/puc/consumers/telephone/     2  Community Action Partnership (CAP) [facility] - Energy Assistance Distance: 2.08 miles      Phone/Virtual   [address] [place] [address]  Language: English, Hmong, Djiboutian, Iraqi  Hours: Mon - Fri 8:00 AM - 12:00 PM , Mon - Fri 1:00 PM - 4:30 PM  Fees: Free   Phone: (775) 719-9397 Email: lucy@caprw.org Website: http://www.caprw.org          Important Numbers & Websites       Emergency Services   911  City Services   311  Poison Control   (907) 257-4208  Suicide Prevention Lifeline   (162) 720-5897 (TALK)  Child Abuse Hotline   (437) 437-1176 (4-A-Child)  Sexual Assault Hotline   (613) 667-1329 (HOPE)  National Runaway Safeline   (156) 942-2530 (RUNAWAY)  All-Options Talkline   (749) 440-9285  Substance Abuse Referral   (279) 180-6519 (HELP)

## 2023-09-28 NOTE — COMMUNITY RESOURCES LIST (ENGLISH)
09/28/2023    [employer]  N/A  For questions about this resource list or additional care needs, please contact your primary care clinic or care manager.  Phone: [phone]   Email: N/A   Address: [address] [place] [id number]   Hours: N/A        Financial Stability       Utility payment assistance  1  [place] [employer] - [place]'s Telephone Assistance Plan (TAP) and Federal Lifeline and Affordable Connectivity Program (ACP) Distance: 1.07 miles      Phone/Virtual   [address] [place] [address]  Language: English  Fees: Free   Phone: (597) 806-5576 Email: consumer.puc@[facility]. Website: https://[place]puc/consumers/telephone/     2  Community Action Partnership (CAP) [facility] - Energy Assistance Distance: 2.08 miles      Phone/Virtual   [address] [place] [address]  Language: English, Hmong, American, Bulgarian  Hours: Mon - Fri 8:00 AM - 12:00 PM , Mon - Fri 1:00 PM - 4:30 PM  Fees: Free   Phone: (658) 817-4004 Email: lucy@caprw.org Website: http://www.caprw.org          Important Numbers & Websites       Emergency Services   911  City Services   311  Poison Control   (898) 386-2572  Suicide Prevention Lifeline   (683) 414-2839 (TALK)  Child Abuse Hotline   (851) 101-5894 (4-A-Child)  Sexual Assault Hotline   (778) 228-1535 (HOPE)  National Runaway Safeline   (368) 669-3905 (RUNAWAY)  All-Options Talkline   (245) 712-7350  Substance Abuse Referral   (520) 114-1931 (HELP)

## 2023-09-29 LAB
C TRACH DNA SPEC QL NAA+PROBE: NEGATIVE
N GONORRHOEA DNA SPEC QL NAA+PROBE: NEGATIVE

## 2023-09-30 LAB — BACTERIA UR CULT: NORMAL

## 2023-10-02 LAB
BKR LAB AP GYN ADEQUACY: NORMAL
BKR LAB AP GYN INTERPRETATION: NORMAL
BKR LAB AP HPV REFLEX: NORMAL
BKR LAB AP LMP: NORMAL
BKR LAB AP PREVIOUS ABNORMAL: NORMAL
PATH REPORT.COMMENTS IMP SPEC: NORMAL
PATH REPORT.COMMENTS IMP SPEC: NORMAL
PATH REPORT.RELEVANT HX SPEC: NORMAL

## 2023-12-01 DIAGNOSIS — G47.00 INSOMNIA, UNSPECIFIED TYPE: ICD-10-CM

## 2023-12-01 RX ORDER — TRAZODONE HYDROCHLORIDE 50 MG/1
TABLET, FILM COATED ORAL
Qty: 60 TABLET | Refills: 0 | Status: SHIPPED | OUTPATIENT
Start: 2023-12-01

## 2024-03-02 ENCOUNTER — HEALTH MAINTENANCE LETTER (OUTPATIENT)
Age: 44
End: 2024-03-02

## 2024-07-29 DIAGNOSIS — N39.0 LOWER URINARY TRACT INFECTIOUS DISEASE: ICD-10-CM

## 2024-07-29 RX ORDER — NITROFURANTOIN 25; 75 MG/1; MG/1
CAPSULE ORAL
Qty: 30 CAPSULE | Refills: 0 | Status: SHIPPED | OUTPATIENT
Start: 2024-07-29

## 2024-08-29 ENCOUNTER — PATIENT OUTREACH (OUTPATIENT)
Dept: CARE COORDINATION | Facility: CLINIC | Age: 44
End: 2024-08-29
Payer: COMMERCIAL

## 2024-09-12 ENCOUNTER — PATIENT OUTREACH (OUTPATIENT)
Dept: CARE COORDINATION | Facility: CLINIC | Age: 44
End: 2024-09-12
Payer: COMMERCIAL

## 2024-10-18 ENCOUNTER — PATIENT OUTREACH (OUTPATIENT)
Dept: CARE COORDINATION | Facility: CLINIC | Age: 44
End: 2024-10-18
Payer: COMMERCIAL

## 2024-10-26 DIAGNOSIS — F33.0 MILD EPISODE OF RECURRENT MAJOR DEPRESSIVE DISORDER (H): ICD-10-CM

## 2024-10-28 RX ORDER — PAROXETINE 30 MG/1
TABLET, FILM COATED ORAL
Qty: 180 TABLET | Refills: 0 | Status: SHIPPED | OUTPATIENT
Start: 2024-10-28

## 2024-11-01 ENCOUNTER — HOSPITAL ENCOUNTER (OUTPATIENT)
Dept: MAMMOGRAPHY | Facility: CLINIC | Age: 44
Discharge: HOME | End: 2024-11-01
Attending: NURSE PRACTITIONER | Admitting: NURSE PRACTITIONER
Payer: COMMERCIAL

## 2024-11-01 DIAGNOSIS — Z12.31 VISIT FOR SCREENING MAMMOGRAM: ICD-10-CM

## 2024-11-01 PROCEDURE — 77063 BREAST TOMOSYNTHESIS BI: CPT

## 2024-11-21 DIAGNOSIS — I10 BENIGN ESSENTIAL HYPERTENSION: ICD-10-CM

## 2024-11-21 RX ORDER — LISINOPRIL AND HYDROCHLOROTHIAZIDE 20; 25 MG/1; MG/1
1 TABLET ORAL DAILY
Qty: 90 TABLET | Refills: 0 | Status: SHIPPED | OUTPATIENT
Start: 2024-11-21

## 2025-01-18 ENCOUNTER — HEALTH MAINTENANCE LETTER (OUTPATIENT)
Age: 45
End: 2025-01-18

## 2025-02-15 DIAGNOSIS — F33.0 MILD EPISODE OF RECURRENT MAJOR DEPRESSIVE DISORDER: ICD-10-CM

## 2025-02-17 RX ORDER — PAROXETINE 30 MG/1
TABLET, FILM COATED ORAL
Qty: 60 TABLET | Refills: 0 | Status: SHIPPED | OUTPATIENT
Start: 2025-02-17

## 2025-02-17 NOTE — TELEPHONE ENCOUNTER
Teresa Ortiz, APRN CNP to [facility] Regarding result: PARoxetine (PAXIL) 30 MG tablet   MARANDA      2/17/25 11:05 AM  The patient is due for a physical/med check. I have not seen her since 2023.  Thanks.    Left message for patient to return call. Will also send [employer] message.    Marianne MARTINI CMA on February 17, 2025 at 1:59 PM

## 2025-03-15 DIAGNOSIS — I10 BENIGN ESSENTIAL HYPERTENSION: ICD-10-CM

## 2025-03-17 RX ORDER — LISINOPRIL AND HYDROCHLOROTHIAZIDE 20; 25 MG/1; MG/1
1 TABLET ORAL DAILY
Qty: 90 TABLET | Refills: 0 | Status: SHIPPED | OUTPATIENT
Start: 2025-03-17

## 2025-06-27 ENCOUNTER — RESULTS FOLLOW-UP (OUTPATIENT)
Dept: FAMILY MEDICINE | Facility: CLINIC | Age: 45
End: 2025-06-27

## 2025-06-27 DIAGNOSIS — I10 BENIGN ESSENTIAL HYPERTENSION: ICD-10-CM

## 2025-06-30 ENCOUNTER — PATIENT OUTREACH (OUTPATIENT)
Dept: CARE COORDINATION | Facility: CLINIC | Age: 45
End: 2025-06-30
Payer: COMMERCIAL

## 2025-06-30 DIAGNOSIS — E61.1 IRON DEFICIENCY: Primary | ICD-10-CM

## 2025-06-30 RX ORDER — LISINOPRIL AND HYDROCHLOROTHIAZIDE 20; 25 MG/1; MG/1
1 TABLET ORAL DAILY
Qty: 90 TABLET | Refills: 0 | Status: SHIPPED | OUTPATIENT
Start: 2025-06-30

## 2025-06-30 RX ORDER — FERROUS SULFATE 325(65) MG
325 TABLET ORAL 2 TIMES DAILY
Qty: 60 TABLET | Refills: 2 | Status: SHIPPED | OUTPATIENT
Start: 2025-06-30

## 2025-07-26 DIAGNOSIS — N39.0 LOWER URINARY TRACT INFECTIOUS DISEASE: ICD-10-CM

## 2025-07-28 RX ORDER — NITROFURANTOIN 25; 75 MG/1; MG/1
CAPSULE ORAL
Qty: 30 CAPSULE | Refills: 0 | Status: SHIPPED | OUTPATIENT
Start: 2025-07-28

## (undated) DEVICE — Device

## (undated) DEVICE — NEEDLE SPINAL DISP 22GA X 3.5" QUINCKE 333320

## (undated) DEVICE — SU VICRYL+ 0 8-18 CT1/CR UND VCP840D

## (undated) DEVICE — DRAPE STERI TOWEL LG 1010

## (undated) DEVICE — GLOVE UNDER INDICATOR PI SZ 6.5 LF 41665

## (undated) DEVICE — WRAP LUMBAR COMPRESS COLD THERAPY 4632P

## (undated) DEVICE — SUTURE VICRYL+ 2-0 18 CT1/CR VLT VCP839D

## (undated) DEVICE — GLOVE BIOGEL PI ULTRATOUCH G SZ 7.0 42170

## (undated) DEVICE — GOWN IMPERVIOUS BREATHABLE SMART XLG 89045

## (undated) DEVICE — PACK COLD 6 X 10 1-HOUR 0814-0610

## (undated) DEVICE — PREP SCRUB SOL EXIDINE 4% CHG 4OZ 29002-404

## (undated) DEVICE — PLATE GROUNDING ADULT W/CORD 9165L

## (undated) DEVICE — BLADE KNIFE SURG 11 371111

## (undated) DEVICE — ESU PENCIL SMOKE EVAC W/ROCKER SWITCH 0703-047-000

## (undated) DEVICE — POSITIONER ARM CRADLE LAMINECTOMY DISP

## (undated) DEVICE — GOWN IMPERVIOUS BREATHABLE SMART LG 89015

## (undated) DEVICE — ALCOHOL ISOPROPYL 4 OZ 70% IA7004

## (undated) DEVICE — SU MONOCRYL+ 4-0 18IN PS2 UND MCP496G

## (undated) DEVICE — CATH TRAY FOLEY SURESTEP 16FR DRAIN BAG STATOCK A899916

## (undated) DEVICE — TRAY PREP DRY SKIN SCRUB 067

## (undated) DEVICE — ESU ELEC BLADE 2.75" COATED/INSULATED E1455

## (undated) DEVICE — POSITIONER PT PRONESAFE HEAD REST W/DERMAPROX INSERT 40599

## (undated) DEVICE — DECANTER VIAL 2006S

## (undated) DEVICE — PITCHER STERILE 1000ML  SSK9004A

## (undated) DEVICE — GLOVE BIOGEL PI ULTRATOUCH G SZ 6.5 42165

## (undated) DEVICE — DRSG PRIMAPORE 03 1/8X6" 66000318

## (undated) DEVICE — GLOVE BIOGEL INDICATOR 7.5 LF 41675

## (undated) DEVICE — TOOL DISSECT MIDAS MR8 14CM MATCH HEAD 3MM MR8-14MH30

## (undated) DEVICE — CUSTOM PACK LUMBAR FUSION SNE5BLFHEA

## (undated) DEVICE — SUCTION MANIFOLD NEPTUNE 2 SYS 1 PORT 702-025-000

## (undated) DEVICE — SOL WATER IRRIG 1000ML BOTTLE 2F7114

## (undated) DEVICE — RX SURGIFLO HEMOSTATIC MATRIX 8ML 2991

## (undated) DEVICE — TOOL DISSECT MIDAS MR8 14CM MATCH HD DMD 3MM MR8-14MH30D

## (undated) DEVICE — SOL NACL 0.9% IRRIG 1000ML BOTTLE 2F7124

## (undated) DEVICE — GLOVE UNDER INDICATOR PI SZ 7.0 LF 41670

## (undated) DEVICE — MARKER SURG SKIN STRL 77734

## (undated) RX ORDER — FENTANYL CITRATE 50 UG/ML
INJECTION, SOLUTION INTRAMUSCULAR; INTRAVENOUS
Status: DISPENSED
Start: 2022-07-12

## (undated) RX ORDER — DEXAMETHASONE SODIUM PHOSPHATE 10 MG/ML
INJECTION, EMULSION INTRAMUSCULAR; INTRAVENOUS
Status: DISPENSED
Start: 2022-10-21

## (undated) RX ORDER — LIDOCAINE HYDROCHLORIDE 10 MG/ML
INJECTION, SOLUTION EPIDURAL; INFILTRATION; INTRACAUDAL; PERINEURAL
Status: DISPENSED
Start: 2022-10-21

## (undated) RX ORDER — FENTANYL CITRATE 50 UG/ML
INJECTION, SOLUTION INTRAMUSCULAR; INTRAVENOUS
Status: DISPENSED
Start: 2022-10-21

## (undated) RX ORDER — FENTANYL CITRATE-0.9 % NACL/PF 10 MCG/ML
PLASTIC BAG, INJECTION (ML) INTRAVENOUS
Status: DISPENSED
Start: 2022-10-21

## (undated) RX ORDER — PROPOFOL 10 MG/ML
INJECTION, EMULSION INTRAVENOUS
Status: DISPENSED
Start: 2022-10-21

## (undated) RX ORDER — ONDANSETRON 2 MG/ML
INJECTION INTRAMUSCULAR; INTRAVENOUS
Status: DISPENSED
Start: 2022-10-21

## (undated) RX ORDER — DIPHENHYDRAMINE HYDROCHLORIDE 50 MG/ML
INJECTION INTRAMUSCULAR; INTRAVENOUS
Status: DISPENSED
Start: 2022-07-12